# Patient Record
Sex: MALE | Race: WHITE | Employment: FULL TIME | URBAN - METROPOLITAN AREA
[De-identification: names, ages, dates, MRNs, and addresses within clinical notes are randomized per-mention and may not be internally consistent; named-entity substitution may affect disease eponyms.]

---

## 2017-10-31 PROBLEM — G47.33 OSA ON CPAP: Status: ACTIVE | Noted: 2017-10-31

## 2017-10-31 PROBLEM — Z99.89 OSA ON CPAP: Status: ACTIVE | Noted: 2017-10-31

## 2019-04-02 ENCOUNTER — OFFICE VISIT (OUTPATIENT)
Dept: FAMILY MEDICINE CLINIC | Age: 29
End: 2019-04-02
Payer: COMMERCIAL

## 2019-04-02 VITALS
OXYGEN SATURATION: 97 % | BODY MASS INDEX: 41.77 KG/M2 | SYSTOLIC BLOOD PRESSURE: 128 MMHG | DIASTOLIC BLOOD PRESSURE: 78 MMHG | TEMPERATURE: 98.2 F | HEIGHT: 69 IN | RESPIRATION RATE: 18 BRPM | WEIGHT: 282 LBS | HEART RATE: 93 BPM

## 2019-04-02 DIAGNOSIS — G47.33 OSA ON CPAP: ICD-10-CM

## 2019-04-02 DIAGNOSIS — Z76.89 ENCOUNTER TO ESTABLISH CARE: Primary | ICD-10-CM

## 2019-04-02 DIAGNOSIS — Z99.89 OSA ON CPAP: ICD-10-CM

## 2019-04-02 DIAGNOSIS — Z72.0 TOBACCO ABUSE: ICD-10-CM

## 2019-04-02 DIAGNOSIS — R05.9 COUGH: ICD-10-CM

## 2019-04-02 DIAGNOSIS — F90.9 ATTENTION DEFICIT HYPERACTIVITY DISORDER (ADHD), UNSPECIFIED ADHD TYPE: ICD-10-CM

## 2019-04-02 DIAGNOSIS — E66.01 CLASS 3 SEVERE OBESITY DUE TO EXCESS CALORIES WITH BODY MASS INDEX (BMI) OF 40.0 TO 44.9 IN ADULT, UNSPECIFIED WHETHER SERIOUS COMORBIDITY PRESENT (HCC): ICD-10-CM

## 2019-04-02 PROCEDURE — 1036F TOBACCO NON-USER: CPT | Performed by: FAMILY MEDICINE

## 2019-04-02 PROCEDURE — 99203 OFFICE O/P NEW LOW 30 MIN: CPT | Performed by: FAMILY MEDICINE

## 2019-04-02 PROCEDURE — G8417 CALC BMI ABV UP PARAM F/U: HCPCS | Performed by: FAMILY MEDICINE

## 2019-04-02 PROCEDURE — G8427 DOCREV CUR MEDS BY ELIG CLIN: HCPCS | Performed by: FAMILY MEDICINE

## 2019-04-02 RX ORDER — BUPROPION HYDROCHLORIDE 150 MG/1
TABLET ORAL
Qty: 60 TABLET | Refills: 1 | Status: SHIPPED | OUTPATIENT
Start: 2019-04-02 | End: 2019-04-30 | Stop reason: SINTOL

## 2019-04-02 RX ORDER — BENZONATATE 100 MG/1
100-200 CAPSULE ORAL 3 TIMES DAILY PRN
Qty: 60 CAPSULE | Refills: 0 | Status: SHIPPED | OUTPATIENT
Start: 2019-04-02 | End: 2019-04-09

## 2019-04-02 ASSESSMENT — PATIENT HEALTH QUESTIONNAIRE - PHQ9
1. LITTLE INTEREST OR PLEASURE IN DOING THINGS: 0
SUM OF ALL RESPONSES TO PHQ QUESTIONS 1-9: 0
SUM OF ALL RESPONSES TO PHQ9 QUESTIONS 1 & 2: 0
SUM OF ALL RESPONSES TO PHQ QUESTIONS 1-9: 0
2. FEELING DOWN, DEPRESSED OR HOPELESS: 0

## 2019-04-02 NOTE — PATIENT INSTRUCTIONS
Patient Education        bupropion  Pronunciation:  byoo PRO pee on  Brand:  Aplenzin, Buproban, Forfivo XL, Wellbutrin SR, Wellbutrin XL, Zyban, Zyban Advantage Pack  What is the most important information I should know about bupropion? You should not take bupropion if you have seizures or an eating disorder, or if you have suddenly stopped using alcohol, seizure medication, or sedatives. If you take Wellbutrin for depression, do not also take Zyban to quit smoking. Do not use bupropion within 14 days before or 14 days after you have used an MAO inhibitor, such as isocarboxazid, linezolid, methylene blue injection, phenelzine, rasagiline, selegiline, or tranylcypromine. Some young people have thoughts about suicide when first taking an antidepressant. Stay alert to changes in your mood or symptoms. Report any new or worsening symptoms to your doctor. What is bupropion? Bupropion is an antidepressant medication used to treat major depressive disorder and seasonal affective disorder. The Zyban brand of bupropion is used to help people stop smoking by reducing cravings and other withdrawal effects. Bupropion may also be used for purposes not listed in this medication guide. What should I discuss with my healthcare provider before taking bupropion? You should not take bupropion if you are allergic to it, or if you have:  · a seizure disorder;  · an eating disorder such as anorexia or bulimia; or  · if you have suddenly stopped using alcohol, seizure medication, or a sedative such as Xanax, Valium, Fiorinal, Klonopin, and others). Do not use an MAO inhibitor within 14 days before or 14 days after you take bupropion. A dangerous drug interaction could occur. MAO inhibitors include isocarboxazid, linezolid, phenelzine, rasagiline, selegiline, and tranylcypromine. Do not take bupropion to treat more than one condition at a time.  If you take bupropion for depression, do not also take this medicine to quit smoking. Bupropion may cause seizures, especially if you have certain medical conditions or use certain drugs. Tell your doctor about all of your medical conditions and the drugs you use. To make sure bupropion is safe for you, tell your doctor if you have:  · a history of head injury, seizures, or brain or spinal cord tumor;  · narrow-angle glaucoma;  · heart disease, high blood pressure, history of heart attack;  · diabetes;  · kidney or liver disease (especially cirrhosis);  · bipolar disorder or other mental illness; or  · if you drink alcohol. Some young people have thoughts about suicide when first taking an antidepressant. Your doctor will need to check your progress at regular visits. Your family or other caregivers should also be alert to changes in your mood or symptoms. It is not known whether this medicine will harm an unborn baby. Tell your doctor if you are pregnant or plan to become pregnant. Bupropion can pass into breast milk and may harm a nursing baby. You should not breast-feed while using this medicine. Bupropion is not approved for use by anyone younger than 25years old. How should I take bupropion? Follow all directions on your prescription label. Do not take this medicine in larger or smaller amounts or for longer than recommended. Too much of this medicine can increase your risk of a seizure. You may take bupropion with or without food. Do not crush, chew, or break an extended-release tablet. Swallow it whole. You should not change your dose or stop using bupropion suddenly, unless you have a seizure while taking this medicine. Stopping suddenly can cause unpleasant withdrawal symptoms. Ask your doctor how to safely stop using bupropion. If you take Zyban to help you stop smoking, you may continue to smoke for about 1 week after you start the medicine. Set a date to quit smoking during the second week of treatment.  Talk to your doctor if you have trouble quitting after taking Zyban for 7 weeks. Your doctor may prescribe nicotine patches or gum to help you stop smoking. Do not smoke at any time if you are using a nicotine product along with Zyban. Too much nicotine can cause serious side effects. Read all patient information, medication guides, and instruction sheets provided to you. Ask your doctor or pharmacist if you have any questions. You may have nicotine withdrawal symptoms when you stop smoking, including: increased appetite, weight gain, trouble sleeping, trouble concentrating, slower heart rate, having the urge to smoke, and feeling anxious, restless, depressed, angry, frustrated, or irritated. These symptoms may occur with or without using medication such as Zyban. Smoking cessation may also cause new or worsening mental health problems, such as depression. Bupropion can cause you to have a false positive drug screening test. If you provide a urine sample for drug screening, tell the laboratory staff that you are taking bupropion. Store at room temperature away from moisture, heat, and light. What happens if I miss a dose? Take the missed dose as soon as you remember. Skip the missed dose if it is almost time for your next scheduled dose. Do not  take extra medicine to make up the missed dose. What happens if I overdose? Seek emergency medical attention or call the Poison Help line at 1-986.305.6931. An overdose of bupropion can be fatal.  Overdose symptoms may include muscle stiffness, hallucinations, fast or uneven heartbeat, shallow breathing, or fainting. What should I avoid while taking bupropion? Drinking alcohol with bupropion may increase your risk of seizures. If you drink alcohol regularly, talk with your doctor before changing the amount you drink. Bupropion can also cause seizures in a regular drinker who suddenly stops drinking at the start of treatment with bupropion. Bupropion may impair your thinking or reactions.  Be careful if you drive or do anything that requires you to be alert. What are the possible side effects of bupropion? Get emergency medical help if you have signs of an allergic reaction: hives, rash or itching; fever, swollen glands, joint pain, general ill feeling; difficult breathing; swelling of your face, lips, tongue, or throat. Report any new or worsening symptoms to your doctor, such as: mood or behavior changes, anxiety, depression, panic attacks, trouble sleeping, or if you feel impulsive, irritable, agitated, hostile, aggressive, restless, hyperactive (mentally or physically), or have thoughts about suicide or hurting yourself. Call your doctor at once if you have:  · a seizure (convulsions);  · unusual changes in mood or behavior;  · a manic episode --racing thoughts, increased energy, reckless behavior, feeling extremely happy or irritable, talking more than usual, severe problems with sleep;  · blurred vision, tunnel vision, eye pain or swelling, or seeing halos around lights;  · fast heartbeats; or  · severe skin reaction --fever, sore throat, swelling in your face or tongue, burning in your eyes, skin pain, followed by a red or purple skin rash that spreads (especially in the face or upper body) and causes blistering and peeling. Common side effects may include:  · dry mouth, stuffy nose;  · nausea, constipation;  · sleep problems (insomnia);  · feeling anxious;  · dizziness; or  · joint pain. This is not a complete list of side effects and others may occur. Call your doctor for medical advice about side effects. You may report side effects to FDA at 8-927-FDA-6672. What other drugs will affect bupropion? You may have a higher risk of seizures if you use certain other medicines while taking bupropion. Many drugs can interact with bupropion. Tell your doctor about all medicines you use, and those you start or stop using during your treatment with bupropion.  This includes prescription and over-the-counter medicines, vitamins, and herbal products. Not all possible interactions are listed in this medication guide. Where can I get more information? Your pharmacist can provide more information about bupropion. Remember, keep this and all other medicines out of the reach of children, never share your medicines with others, and use this medication only for the indication prescribed. Every effort has been made to ensure that the information provided by [facility] is accurate, up-to-date, and complete, but no guarantee is made to that effect. Drug information contained herein may be time sensitive. [facility] information has been compiled for use by healthcare practitioners and consumers in the [place] and therefore [facility] does not warrant that uses outside of the [place] are appropriate, unless specifically indicated otherwise. [facility]'s drug information does not endorse drugs, diagnose patients or recommend therapy. [facility] drug information is an informational resource designed to assist licensed healthcare practitioners in caring for their patients and/or to serve consumers viewing this service as a supplement to, and not a substitute for, the expertise, skill, knowledge and judgment of healthcare practitioners. The absence of a warning for a given drug or drug combination in no way should be construed to indicate that the drug or drug combination is safe, effective or appropriate for any given patient. [facility] does not assume any responsibility for any aspect of healthcare administered with the aid of information [facility] provides. The information contained herein is not intended to cover all possible uses, directions, precautions, warnings, drug interactions, allergic reactions, or adverse effects. If you have questions about the drugs you are taking, check with your doctor, nurse or pharmacist.  Copyright 1318-2253 [employer] [address] [place] [address] Avenue: 20.01. Revision date: 5/9/2017.   Care instructions adapted under license by [facility] ([facility]). If you have questions about a medical condition or this instruction, always ask your healthcare professional. [address] any warranty or liability for your use of this information.

## 2019-04-02 NOTE — PROGRESS NOTES
2019    Chief Complaint   Patient presents with   Eladio Silva Doctor     HM needs labs, previous pcp Dr Liliane Barraza,     Sinus Problem     SOB, weight issues       Deborah Zarate (:  1990) is a 25 y.o. male, here for establishing care. They report a PMH of:  Past Medical History:   Diagnosis Date    ADHD (attention deficit hyperactivity disorder)     Sleep apnea      Social and family histories reviewed and updated as appropriate. He was previously a patient of Dr. Karin Daniel  He works as a   He is  and has a son     Tobacco abuse  - He is a current smoker, his is smoking a few cigarettes every few days and has been trying to cut back  - He does report chronic shortness of breath  - He would be interesting in smoking cessation therapy    Obesity  - He does tend to eat healthy diet, he does not exercise regularly. History of ADHD  - Long-standing, chronic issue.  - Has previously saw counseling and was on stimulant medications. - He complains of frequent difficulty with organization, completing tasks, etc.    BLADE  - diagnosed with sleep study in 2017  - compliant with CPAP, needs new supplies    Review of Systems   Constitutional: Positive for unexpected weight change. Negative for chills, fatigue and fever. HENT: Positive for congestion and rhinorrhea. Respiratory: Positive for cough and shortness of breath. Cardiovascular: Negative for chest pain, palpitations and leg swelling. Gastrointestinal: Negative for abdominal pain, constipation, diarrhea, nausea and vomiting. Genitourinary: Negative for difficulty urinating. Musculoskeletal: Negative for arthralgias, back pain and gait problem. Skin: Negative for rash. Neurological: Negative for dizziness, weakness and numbness. Prior to Visit Medications    Medication Sig Taking?  Authorizing Provider   Prenatal Multivit-Min-Fe-FA (PRENATAL VITAMINS PO) Take 1 tablet by mouth daily Yes index is 41.64 kg/m² as calculated from the following:    Height as of this encounter: 5' 9\" (1.753 m). Weight as of this encounter: 282 lb (127.9 kg). Physical Exam   Constitutional: He is oriented to person, place, and time. He appears well-developed and well-nourished. No distress. Obese   HENT:   Head: Normocephalic and atraumatic. Eyes: Conjunctivae and EOM are normal. Right eye exhibits no discharge. Left eye exhibits no discharge. Neck: Normal range of motion. Cardiovascular: Normal rate and regular rhythm. No murmur heard. Pulmonary/Chest: Effort normal and breath sounds normal. No respiratory distress. He has no wheezes. Abdominal: Soft. He exhibits no distension. There is no tenderness. Musculoskeletal: Normal range of motion. He exhibits no edema or deformity. Neurological: He is alert and oriented to person, place, and time. Skin: Skin is warm and dry. Psychiatric: He has a normal mood and affect. His behavior is normal.       ASSESSMENT/PLAN:   Diagnosis Orders   1. Encounter to establish care     2. BLADE on CPAP  DME Order for (Specify) as OP   3. Class 3 severe obesity due to excess calories with body mass index (BMI) of 40.0 to 44.9 in adult, unspecified whether serious comorbidity present (HCC)  TSH    CBC    Comprehensive Metabolic Panel    HEMOGLOBIN A1C    LIPID PANEL   4. Cough  benzonatate (TESSALON) 100 MG capsule   5. Tobacco abuse  buPROPion (WELLBUTRIN XL) 150 MG extended release tablet   6. Attention deficit hyperactivity disorder (ADHD), unspecified ADHD type         Additional plan and future considerations:   As above. Will start Wellbutrin for smoking cessation and ADHD.  on improvd diet, regular exercise, and weight loss. We'll order new CPAP supplies. Return to office in 4 weeks for lab review, obesity and ADHD follow-up or sooner if needed.     Educational materials and/or home exercises printed for patient's review and were included in patient instructions on his/her After Visit Summary and given to patient at the end of visit. Counseled regarding above diagnosis, including possible risks and complications,  especially if left uncontrolled. Counseled regarding the possible side effects, risks, benefits and alternatives to treatment; patient and/or guardian verbalizes understanding, agrees, feels comfortable with and wishes to proceed with above treatment plan. Advised patient to call with any new medication issues, and read all Rx info from pharmacy to assure aware of all possible risks and side effects of medication before taking. Reviewed age and gender appropriate health screeningexams and vaccinations. Advised patient regarding importance of keeping up with recommended health maintenance and to schedule as soon as possible if overdue, as this is important in assessing for undiagnosed pathology,especially cancer, as well as protecting against potentially harmful/life threatening disease. Patient and/or guardian verbalizes understanding and agrees with above counseling, assessment and plan. All questions answered.       Future Appointments   Date Time Provider Tanika Link   4/30/2019 10:15 AM Frederic Lamas DO [facility]         --Frederic Lamas DO on 4/2/2019 at 3:49 PM

## 2019-04-03 ENCOUNTER — HOSPITAL ENCOUNTER (OUTPATIENT)
Age: 29
Discharge: HOME | End: 2019-04-05
Payer: COMMERCIAL

## 2019-04-03 DIAGNOSIS — E66.01 CLASS 3 SEVERE OBESITY DUE TO EXCESS CALORIES WITH BODY MASS INDEX (BMI) OF 40.0 TO 44.9 IN ADULT, UNSPECIFIED WHETHER SERIOUS COMORBIDITY PRESENT (HCC): ICD-10-CM

## 2019-04-03 PROBLEM — F90.9 ATTENTION DEFICIT HYPERACTIVITY DISORDER (ADHD): Status: ACTIVE | Noted: 2019-04-03

## 2019-04-03 PROBLEM — Z72.0 TOBACCO ABUSE: Status: ACTIVE | Noted: 2019-04-03

## 2019-04-03 LAB
ALBUMIN SERPL-MCNC: 4.2 G/DL (ref 3.5–5.2)
ALP BLD-CCNC: 47 U/L (ref 40–129)
ALT SERPL-CCNC: 75 U/L (ref 0–40)
ANION GAP SERPL CALCULATED.3IONS-SCNC: 16 MMOL/L (ref 7–16)
AST SERPL-CCNC: 42 U/L (ref 0–39)
BILIRUB SERPL-MCNC: 0.3 MG/DL (ref 0–1.2)
BUN BLDV-MCNC: 14 MG/DL (ref 6–20)
CALCIUM SERPL-MCNC: 9 MG/DL (ref 8.6–10.2)
CHLORIDE BLD-SCNC: 102 MMOL/L (ref 98–107)
CHOLESTEROL, TOTAL: 156 MG/DL (ref 0–199)
CO2: 23 MMOL/L (ref 22–29)
CREAT SERPL-MCNC: 1 MG/DL (ref 0.7–1.2)
GFR AFRICAN AMERICAN: >60
GFR NON-AFRICAN AMERICAN: >60 ML/MIN/1.73
GLUCOSE BLD-MCNC: 100 MG/DL (ref 74–99)
HBA1C MFR BLD: 5.5 % (ref 4–5.6)
HCT VFR BLD CALC: 46.6 % (ref 37–54)
HDLC SERPL-MCNC: 30 MG/DL
HEMOGLOBIN: 15.6 G/DL (ref 12.5–16.5)
LDL CHOLESTEROL CALCULATED: 103 MG/DL (ref 0–99)
MCH RBC QN AUTO: 30.9 PG (ref 26–35)
MCHC RBC AUTO-ENTMCNC: 33.5 % (ref 32–34.5)
MCV RBC AUTO: 92.3 FL (ref 80–99.9)
PDW BLD-RTO: 14.6 FL (ref 11.5–15)
PLATELET # BLD: 216 E9/L (ref 130–450)
PMV BLD AUTO: 11.1 FL (ref 7–12)
POTASSIUM SERPL-SCNC: 4.2 MMOL/L (ref 3.5–5)
RBC # BLD: 5.05 E12/L (ref 3.8–5.8)
SODIUM BLD-SCNC: 141 MMOL/L (ref 132–146)
TOTAL PROTEIN: 7.5 G/DL (ref 6.4–8.3)
TRIGL SERPL-MCNC: 115 MG/DL (ref 0–149)
TSH SERPL DL<=0.05 MIU/L-ACNC: 5.48 UIU/ML (ref 0.27–4.2)
VLDLC SERPL CALC-MCNC: 23 MG/DL
WBC # BLD: 4.6 E9/L (ref 4.5–11.5)

## 2019-04-03 PROCEDURE — 84443 ASSAY THYROID STIM HORMONE: CPT

## 2019-04-03 PROCEDURE — 80053 COMPREHEN METABOLIC PANEL: CPT

## 2019-04-03 PROCEDURE — 85027 COMPLETE CBC AUTOMATED: CPT

## 2019-04-03 PROCEDURE — 80061 LIPID PANEL: CPT

## 2019-04-03 PROCEDURE — 83036 HEMOGLOBIN GLYCOSYLATED A1C: CPT

## 2019-04-03 PROCEDURE — 84439 ASSAY OF FREE THYROXINE: CPT

## 2019-04-03 PROCEDURE — 36415 COLL VENOUS BLD VENIPUNCTURE: CPT

## 2019-04-03 ASSESSMENT — ENCOUNTER SYMPTOMS
ABDOMINAL PAIN: 0
NAUSEA: 0
BACK PAIN: 0
SHORTNESS OF BREATH: 1
VOMITING: 0
CONSTIPATION: 0
COUGH: 1
DIARRHEA: 0
RHINORRHEA: 1

## 2019-04-04 DIAGNOSIS — R79.89 ELEVATED TSH: Primary | ICD-10-CM

## 2019-04-04 LAB — T4 FREE: 1.12 NG/DL (ref 0.93–1.7)

## 2019-04-19 ENCOUNTER — TELEPHONE (OUTPATIENT)
Dept: FAMILY MEDICINE CLINIC | Age: 29
End: 2019-04-19

## 2019-04-19 NOTE — TELEPHONE ENCOUNTER
Patient states that he has been having bad headaches for 3-4 days now not sure if it is due to the Wellbutrin?  What to do  Please advise    Mandy Maurer in [place]    Last Appointment   4/2/2019  Next Appointment  4/30/2019  Patient informed that all medications can take up to 72 business hours, can check with their pharmacy in the meantime if they like

## 2019-04-22 NOTE — TELEPHONE ENCOUNTER
It could potentially be a side effect of wellbutrin. Patient can discontinue and we can discuss alternative treatment at next office visit. Thanks.

## 2019-04-30 ENCOUNTER — OFFICE VISIT (OUTPATIENT)
Dept: FAMILY MEDICINE CLINIC | Age: 29
End: 2019-04-30
Payer: COMMERCIAL

## 2019-04-30 ENCOUNTER — HOSPITAL ENCOUNTER (OUTPATIENT)
Age: 29
Discharge: HOME | End: 2019-05-02
Payer: COMMERCIAL

## 2019-04-30 VITALS
DIASTOLIC BLOOD PRESSURE: 76 MMHG | OXYGEN SATURATION: 97 % | WEIGHT: 284 LBS | BODY MASS INDEX: 42.06 KG/M2 | RESPIRATION RATE: 18 BRPM | SYSTOLIC BLOOD PRESSURE: 128 MMHG | HEIGHT: 69 IN | TEMPERATURE: 98.4 F | HEART RATE: 90 BPM

## 2019-04-30 DIAGNOSIS — F90.9 ATTENTION DEFICIT HYPERACTIVITY DISORDER (ADHD), UNSPECIFIED ADHD TYPE: Primary | ICD-10-CM

## 2019-04-30 DIAGNOSIS — Z72.0 TOBACCO ABUSE: ICD-10-CM

## 2019-04-30 DIAGNOSIS — R79.89 ELEVATED LFTS: ICD-10-CM

## 2019-04-30 DIAGNOSIS — E66.01 CLASS 3 SEVERE OBESITY DUE TO EXCESS CALORIES WITHOUT SERIOUS COMORBIDITY WITH BODY MASS INDEX (BMI) OF 40.0 TO 44.9 IN ADULT (HCC): ICD-10-CM

## 2019-04-30 DIAGNOSIS — R51.9 NONINTRACTABLE HEADACHE, UNSPECIFIED CHRONICITY PATTERN, UNSPECIFIED HEADACHE TYPE: ICD-10-CM

## 2019-04-30 DIAGNOSIS — R79.89 ELEVATED TSH: ICD-10-CM

## 2019-04-30 LAB
ALBUMIN SERPL-MCNC: 4.5 G/DL (ref 3.5–5.2)
ALP BLD-CCNC: 53 U/L (ref 40–129)
ALT SERPL-CCNC: 139 U/L (ref 0–40)
AST SERPL-CCNC: 61 U/L (ref 0–39)
BILIRUB SERPL-MCNC: 0.4 MG/DL (ref 0–1.2)
BILIRUBIN DIRECT: <0.2 MG/DL (ref 0–0.3)
BILIRUBIN, INDIRECT: ABNORMAL MG/DL (ref 0–1)
TOTAL PROTEIN: 7.5 G/DL (ref 6.4–8.3)

## 2019-04-30 PROCEDURE — 80074 ACUTE HEPATITIS PANEL: CPT

## 2019-04-30 PROCEDURE — 36415 COLL VENOUS BLD VENIPUNCTURE: CPT

## 2019-04-30 PROCEDURE — G8417 CALC BMI ABV UP PARAM F/U: HCPCS | Performed by: FAMILY MEDICINE

## 2019-04-30 PROCEDURE — 4004F PT TOBACCO SCREEN RCVD TLK: CPT | Performed by: FAMILY MEDICINE

## 2019-04-30 PROCEDURE — G8427 DOCREV CUR MEDS BY ELIG CLIN: HCPCS | Performed by: FAMILY MEDICINE

## 2019-04-30 PROCEDURE — 99214 OFFICE O/P EST MOD 30 MIN: CPT | Performed by: FAMILY MEDICINE

## 2019-04-30 PROCEDURE — 80076 HEPATIC FUNCTION PANEL: CPT

## 2019-04-30 RX ORDER — VARENICLINE TARTRATE 25 MG
KIT ORAL
Qty: 53 TABLET | Refills: 0 | Status: SHIPPED | OUTPATIENT
Start: 2019-04-30 | End: 2019-05-21 | Stop reason: SDUPTHER

## 2019-04-30 RX ORDER — MELOXICAM 15 MG/1
15 TABLET ORAL DAILY PRN
Qty: 30 TABLET | Refills: 1 | Status: SHIPPED | OUTPATIENT
Start: 2019-04-30 | End: 2020-01-08

## 2019-04-30 RX ORDER — DEXTROAMPHETAMINE SACCHARATE, AMPHETAMINE ASPARTATE MONOHYDRATE, DEXTROAMPHETAMINE SULFATE AND AMPHETAMINE SULFATE 5; 5; 5; 5 MG/1; MG/1; MG/1; MG/1
20 CAPSULE, EXTENDED RELEASE ORAL EVERY MORNING
Qty: 30 CAPSULE | Refills: 0 | Status: SHIPPED | OUTPATIENT
Start: 2019-04-30 | End: 2019-05-21 | Stop reason: SDUPTHER

## 2019-04-30 RX ORDER — VARENICLINE TARTRATE 25 MG
KIT ORAL
Qty: 53 TABLET | Refills: 0 | Status: SHIPPED | OUTPATIENT
Start: 2019-04-30 | End: 2019-04-30 | Stop reason: SDUPTHER

## 2019-04-30 ASSESSMENT — ENCOUNTER SYMPTOMS
DIARRHEA: 0
SHORTNESS OF BREATH: 1
NAUSEA: 0
VOMITING: 0
COUGH: 0
ABDOMINAL PAIN: 0

## 2019-04-30 NOTE — PROGRESS NOTES
2019     Rolo Greenberg (:  1990) is a 25 y.o. male, with a:  Past Medical History:   Diagnosis Date    ADHD (attention deficit hyperactivity disorder)     Class 3 severe obesity due to excess calories without serious comorbidity with body mass index (BMI) of 40.0 to 44.9 in adult [facility]) 2019    Sleep apnea     Tobacco abuse 4/3/2019       Here for evaluation of the following medical concerns:  Chief Complaint   Patient presents with    Results     Labs done on 4/3/2019    Weight Management     ADHD follow up     Planning to move to [facility] soon    ADHD - this is a chronic issue. He had been diagnosed in childhood and had been on stimulant medication in the past.  He reports persistent worsening of his difficulty with focusing and completing tasks. He recently lost his job. He was started on Wellbutrin at last visit but did not tolerate due to severe headaches. Obesity - has been attempting to eat better. He does not exercise regularly    Elevated TSH - recent labs reviewed. He does complain of significant weight gain. He denies any significant fatigue. He denies any heat or cold intolerance. He denies any skin or hair changes. HLD - lipid panel reviewed    BLADE - compliant with CPAP    Tobacco abuse - he continues to smoke. He was unable tolerate Wellbutrin. He does report that he was previously on Chantix in the past with significant success. Review of Systems   Constitutional: Positive for unexpected weight change. Negative for fatigue. HENT: Negative. Respiratory: Positive for shortness of breath. Negative for cough. Cardiovascular: Negative for chest pain. Gastrointestinal: Negative for abdominal pain, diarrhea, nausea and vomiting. Endocrine: Negative for cold intolerance and heat intolerance. Genitourinary: Negative for difficulty urinating and dysuria. Neurological: Positive for headaches. Negative for weakness and numbness. unspecified ADHD type  amphetamine-dextroamphetamine (ADDERALL XR) 20 MG extended release capsule   2. Class 3 severe obesity due to excess calories without serious comorbidity with body mass index (BMI) of 40.0 to 44.9 in adult (HCC)     3. Elevated TSH     4. Elevated LFTs  Hepatitis Panel, Acute    Hepatic Function Panel   5. Tobacco abuse  varenicline (CHANTIX STARTING MONTH MYKEL) 0.5 MG X 11 & 1 MG X 42 tablet   6. Nonintractable headache, unspecified chronicity pattern, unspecified headache type  meloxicam (MOBIC) 15 MG tablet       Additional plan and future considerations:   As above. Will start adderall.  and improve diet, regular exercise, and weight loss. Continue to monitor thyroid function for now. Repeat hepatic function panel and check hepatitis. chantix for smoking cessation. RTO in 4 weeks or sooner if needed    Educational materials and/or home exercises printed for patient's review and were included in patient instructions on his/her After Visit Summary and given to patient at the end of visit. Counseled regarding above diagnosis, including possible risks and complications,  especially if left uncontrolled. Counseled regarding the possible side effects, risks, benefits and alternatives to treatment; patient and/or guardian verbalizes understanding, agrees, feels comfortable with and wishes to proceed with above treatment plan. Advised patient to call with any new medication issues, and read all Rx info from pharmacy to assure aware of all possible risks and side effects of medication before taking. Reviewed age and gender appropriate health screeningexams and vaccinations. Advised patient regarding importance of keeping up with recommended health maintenance and to schedule as soon as possible if overdue, as this is important in assessing for undiagnosed pathology,especially cancer, as well as protecting against potentially harmful/life threatening disease.       Patient and/or guardian verbalizes understanding and agrees with above counseling, assessment and plan. All questions answered.       Future Appointments   Date Time Provider Tanika Link   5/21/2019  9:00 AM Amanda Jones DO [facility]         --Amanda Jones DO on 4/30/2019 at 10:33 AM

## 2019-05-02 LAB
HAV IGM SER IA-ACNC: NORMAL
HEPATITIS B CORE IGM ANTIBODY: NORMAL
HEPATITIS B SURFACE ANTIGEN INTERPRETATION: NORMAL
HEPATITIS C ANTIBODY INTERPRETATION: NORMAL

## 2019-05-21 ENCOUNTER — OFFICE VISIT (OUTPATIENT)
Dept: FAMILY MEDICINE CLINIC | Age: 29
End: 2019-05-21
Payer: COMMERCIAL

## 2019-05-21 VITALS
DIASTOLIC BLOOD PRESSURE: 64 MMHG | WEIGHT: 271 LBS | HEIGHT: 69 IN | BODY MASS INDEX: 40.14 KG/M2 | SYSTOLIC BLOOD PRESSURE: 118 MMHG | OXYGEN SATURATION: 96 % | TEMPERATURE: 97.3 F | HEART RATE: 91 BPM

## 2019-05-21 DIAGNOSIS — Z72.0 TOBACCO ABUSE: ICD-10-CM

## 2019-05-21 DIAGNOSIS — R79.89 ELEVATED LFTS: ICD-10-CM

## 2019-05-21 DIAGNOSIS — F90.9 ATTENTION DEFICIT HYPERACTIVITY DISORDER (ADHD), UNSPECIFIED ADHD TYPE: Primary | ICD-10-CM

## 2019-05-21 DIAGNOSIS — Z23 NEED FOR TDAP VACCINATION: ICD-10-CM

## 2019-05-21 DIAGNOSIS — E66.01 CLASS 3 SEVERE OBESITY DUE TO EXCESS CALORIES WITHOUT SERIOUS COMORBIDITY WITH BODY MASS INDEX (BMI) OF 40.0 TO 44.9 IN ADULT (HCC): ICD-10-CM

## 2019-05-21 DIAGNOSIS — E03.8 SUBCLINICAL HYPOTHYROIDISM: ICD-10-CM

## 2019-05-21 PROCEDURE — G8417 CALC BMI ABV UP PARAM F/U: HCPCS | Performed by: FAMILY MEDICINE

## 2019-05-21 PROCEDURE — 99214 OFFICE O/P EST MOD 30 MIN: CPT | Performed by: FAMILY MEDICINE

## 2019-05-21 PROCEDURE — 90715 TDAP VACCINE 7 YRS/> IM: CPT | Performed by: FAMILY MEDICINE

## 2019-05-21 PROCEDURE — 90472 IMMUNIZATION ADMIN EACH ADD: CPT | Performed by: FAMILY MEDICINE

## 2019-05-21 PROCEDURE — G8427 DOCREV CUR MEDS BY ELIG CLIN: HCPCS | Performed by: FAMILY MEDICINE

## 2019-05-21 PROCEDURE — 90732 PPSV23 VACC 2 YRS+ SUBQ/IM: CPT | Performed by: FAMILY MEDICINE

## 2019-05-21 PROCEDURE — 90471 IMMUNIZATION ADMIN: CPT | Performed by: FAMILY MEDICINE

## 2019-05-21 PROCEDURE — 4004F PT TOBACCO SCREEN RCVD TLK: CPT | Performed by: FAMILY MEDICINE

## 2019-05-21 RX ORDER — DEXTROAMPHETAMINE SACCHARATE, AMPHETAMINE ASPARTATE MONOHYDRATE, DEXTROAMPHETAMINE SULFATE AND AMPHETAMINE SULFATE 7.5; 7.5; 7.5; 7.5 MG/1; MG/1; MG/1; MG/1
30 CAPSULE, EXTENDED RELEASE ORAL EVERY MORNING
Qty: 30 CAPSULE | Refills: 0 | Status: SHIPPED | OUTPATIENT
Start: 2019-07-19 | End: 2019-10-03 | Stop reason: SDUPTHER

## 2019-05-21 RX ORDER — DEXTROAMPHETAMINE SACCHARATE, AMPHETAMINE ASPARTATE MONOHYDRATE, DEXTROAMPHETAMINE SULFATE AND AMPHETAMINE SULFATE 7.5; 7.5; 7.5; 7.5 MG/1; MG/1; MG/1; MG/1
30 CAPSULE, EXTENDED RELEASE ORAL EVERY MORNING
Qty: 30 CAPSULE | Refills: 0 | Status: SHIPPED | OUTPATIENT
Start: 2019-06-18 | End: 2019-05-21 | Stop reason: SDUPTHER

## 2019-05-21 RX ORDER — DEXTROAMPHETAMINE SACCHARATE, AMPHETAMINE ASPARTATE MONOHYDRATE, DEXTROAMPHETAMINE SULFATE AND AMPHETAMINE SULFATE 7.5; 7.5; 7.5; 7.5 MG/1; MG/1; MG/1; MG/1
30 CAPSULE, EXTENDED RELEASE ORAL EVERY MORNING
Qty: 30 CAPSULE | Refills: 0 | Status: SHIPPED | OUTPATIENT
Start: 2019-05-21 | End: 2019-05-21 | Stop reason: SDUPTHER

## 2019-05-21 RX ORDER — VARENICLINE TARTRATE 1 MG/1
1 TABLET, FILM COATED ORAL 2 TIMES DAILY
Qty: 60 TABLET | Refills: 2 | Status: SHIPPED | OUTPATIENT
Start: 2019-05-21 | End: 2019-10-03 | Stop reason: SDUPTHER

## 2019-05-21 ASSESSMENT — ENCOUNTER SYMPTOMS
DIARRHEA: 0
CONSTIPATION: 0
SHORTNESS OF BREATH: 1
WHEEZING: 0
COUGH: 1
VOMITING: 0
NAUSEA: 0

## 2019-05-21 NOTE — PROGRESS NOTES
2019     Kaylee Snyder (:  1990) is a 25 y.o. male, with a:  Past Medical History:   Diagnosis Date    ADHD (attention deficit hyperactivity disorder)     Class 3 severe obesity due to excess calories without serious comorbidity with body mass index (BMI) of 40.0 to 44.9 in adult [facility]) 2019    Sleep apnea     Tobacco abuse 4/3/2019       Here for evaluation of the following medical concerns:  Chief Complaint   Patient presents with    1 Month Follow-Up     started chantix on Friday, adderal working till late afternnon then [employer]".  Health Maintenance     Tdap      ADHD - this is a chronic issue. He had been diagnosed in childhood and had been on stimulant medication in the past.  He reports persistent worsening of his difficulty with focusing and completing tasks. He recently lost his job. He was started on Wellbutrin at last visit but did not tolerate due to severe headaches. At previous visit, he was started on adderal. He reports improvement of symptoms. He does not decreased effectiveness in the evenings. Tobacco abuse - He was unable tolerate Wellbutrin. He was recently started on chantix and has noted a decrease in cravings    Subclinical hypothyroidism - recent labs reviewed.       BLADE - compliant with CPAP    Obesity - has been attempting to eat better, he has cut out pop. He has been more active. He has lost 10 lbs since last visit    Review of Systems   Constitutional: Negative for chills, fatigue and fever. Respiratory: Positive for cough and shortness of breath. Negative for wheezing. Gastrointestinal: Negative for constipation, diarrhea, nausea and vomiting. Genitourinary: Negative for difficulty urinating and dysuria. Psychiatric/Behavioral: Positive for decreased concentration (improved). Negative for dysphoric mood. The patient is nervous/anxious (improved). Prior to Visit Medications    Medication Sig Taking?  Authorizing Provider amphetamine-dextroamphetamine (ADDERALL XR) 20 MG extended release capsule Take 1 capsule by mouth every morning for 30 days. Yes Riaz Pierce, DO   meloxicam (MOBIC) 15 MG tablet Take 1 tablet by mouth daily as needed for Pain Yes Riaz Pierce, DO   varenicline (CHANTIX STARTING MONTH PAK) 0.5 MG X 11 & 1 MG X 42 tablet Take as directed on package. Yes Amanda Jones, DO   Prenatal Multivit-Min-Fe-FA (PRENATAL VITAMINS PO) Take 1 tablet by mouth daily Yes Historical Provider, MD        Social History     Tobacco Use    Smoking status: Current Some Day Smoker     Packs/day: 1.00     Years: 10.00     Pack years: 10.00    Smokeless tobacco: Never Used   Substance Use Topics    Alcohol use: Yes     Comment: social        Past Surgical History:   Procedure Laterality Date    FOOT SURGERY  2009    left foot       Vitals:    05/21/19 0910   BP: 118/64   Site: Left Upper Arm   Position: Sitting   Pulse: 91   Temp: 97.3 °F (36.3 °C)   SpO2: 96%   Weight: 271 lb (122.9 kg)   Height: 5' 9\" (1.753 m)     Estimated body mass index is 40.02 kg/m² as calculated from the following:    Height as of this encounter: 5' 9\" (1.753 m). Weight as of this encounter: 271 lb (122.9 kg). Physical Exam   Constitutional: He is oriented to person, place, and time. He appears well-developed and well-nourished. No distress. HENT:   Head: Normocephalic and atraumatic. Eyes: EOM are normal.   Neck: Normal range of motion. Cardiovascular: Normal rate and regular rhythm. No murmur heard. Pulmonary/Chest: Effort normal and breath sounds normal. No respiratory distress. He has no wheezes. Abdominal: Soft. He exhibits no distension. There is no tenderness. Musculoskeletal: Normal range of motion. He exhibits no edema or deformity. Neurological: He is alert and oriented to person, place, and time. Skin: Skin is warm. Psychiatric: He has a normal mood and affect. ASSESSMENT/PLAN:   Diagnosis Orders   1.  Attention deficit hyperactivity disorder (ADHD), unspecified ADHD type  amphetamine-dextroamphetamine (ADDERALL XR) 30 MG extended release capsule    DISCONTINUED: amphetamine-dextroamphetamine (ADDERALL XR) 30 MG extended release capsule    DISCONTINUED: amphetamine-dextroamphetamine (ADDERALL XR) 30 MG extended release capsule   2. Tobacco abuse  Pneumococcal polysaccharide vaccine 23-valent greater than or equal to 3yo subcutaneous/IM   3. Class 3 severe obesity due to excess calories without serious comorbidity with body mass index (BMI) of 40.0 to 44.9 in adult ([facility] [address]     4. Subclinical hypothyroidism     5. Elevated LFTs     6. Need for Tdap vaccination  Tdap (age 6y and older) IM ([employer])       Additional plan and future considerations:   As above. Increase adderall. Continue chantix. Congratulated on weight loss.  on continued improved diet and regular exercise. Instructed to establish with PCP in [place] for follow up of subclinical hypothyroidism and LFTs. RTO as needed.       --Lucinda Parmar, DO on 5/21/2019 at 9:17 AM

## 2019-05-21 NOTE — PATIENT INSTRUCTIONS
Patient Education     Good luck with the move to [place]! Be sure to establish with a primary care physician to follow up on the following issues:   - subclinical hypothyroidism   - elevated LFTs   - tobacco abuse   - ADHD    with recommended repeat labs in the next few months and/or imaging if needed       Pneumococcal Polysaccharide Vaccine: What You Need to Know  Why get vaccinated? Vaccination can protect older adults (and some children and younger adults) from pneumococcal disease. Pneumococcal disease is caused by bacteria that can spread from person to person through close contact. It can cause ear infections, and it can also lead to more serious infections of the:  · Lungs (pneumonia),  · Blood (bacteremia), and  · Covering of the brain and spinal cord (meningitis). Meningitis can cause deafness and brain damage, and it can be fatal.  Anyone can get pneumococcal disease, but children under 3years of age, people with certain medical conditions, adults over 72years of age, and cigarette smokers are at the highest risk. About 18,000 older adults die each year from pneumococcal disease in the [place]. Treatment of pneumococcal infections with penicillin and other drugs used to be more effective. But some strains of the disease have become resistant to these drugs. This makes prevention of the disease, through vaccination, even more important. Pneumococcal polysaccharide vaccine (PPSV23)  Pneumococcal polysaccharide vaccine (PPSV23) protects against 23 types of pneumococcal bacteria. It will not prevent all pneumococcal disease. PPSV23 is recommended for:  · All adults 72years of age and older,  · Anyone 2 through 59years of age with certain long-term health problems,  · Anyone 2 through 59years of age with a weakened immune system,  · Adults 23 through 59years of age who smoke cigarettes or have asthma. Most people need only one dose of PPSV.  A second dose is recommended for certain high-risk groups. People 72 and older should get a dose even if they have gotten one or more doses of the vaccine before they turned 65. Your healthcare provider can give you more information about these recommendations. Most healthy adults develop protection within 2 to 3 weeks of getting the shot. Some people should not get this vaccine  · Anyone who has had a life-threatening allergic reaction to PPSV should not get another dose. · Anyone who has a severe allergy to any component of PPSV should not receive it. Tell your provider if you have any severe allergies. · Anyone who is moderately or severely ill when the shot is scheduled may be asked to wait until they recover before getting the vaccine. Someone with a mild illness can usually be vaccinated. · Children less than 3years of age should not receive this vaccine. · There is no evidence that PPSV is harmful to either a pregnant woman or to her fetus. However, as a precaution, women who need the vaccine should be vaccinated before becoming pregnant, if possible. Risks of a vaccine reaction  With any medicine, including vaccines, there is a chance of side effects. These are usually mild and go away on their own, but serious reactions are also possible. About half of people who get PPSV have mild side effects, such as redness or pain where the shot is given, which go away within about two days. Less than 1 out of 100 people develop a fever, muscle aches, or more severe local reactions. Problems that could happen after any vaccine:  · People sometimes faint after a medical procedure, including vaccination. Sitting or lying down for about 15 minutes can help prevent fainting, and injuries caused by a fall. Tell your doctor if you feel dizzy, or have vision changes or ringing in the ears. · Some people get severe pain in the shoulder and have difficulty moving the arm where a shot was given. This happens very rarely.   · Any medication can cause a severe allergic reaction. Such reactions from a vaccine are very rare, estimated at about 1 in a million doses, and would happen within a few minutes to a few hours after the vaccination. As with any medicine, there is a very remote chance of a vaccine causing a serious injury or death. The safety of vaccines is always being monitored. For more information, visit: www.cdc.gov/vaccinesafety/  What if there is a serious reaction? What should I look for? Look for anything that concerns you, such as signs of a severe allergic reaction, very high fever, or unusual behavior. Signs of a severe allergic reaction can include hives, swelling of the face and throat, difficulty breathing, a fast heartbeat, dizziness, and weakness. These would usually start a few minutes to a few hours after the vaccination. What should I do? If you think it is a severe allergic reaction or other emergency that can't wait, call 9-1-1 or get to the nearest hospital. Otherwise, call your doctor. Afterward, the reaction should be reported to the Vaccine Adverse Event Reporting System (VAERS). Your doctor might file this report, or you can do it yourself through the VAERS web site at www.vaers. [facility].gov, or by calling 5-280.796.8621. [facility] does not give medical advice. How can I learn more? · Ask your doctor. He or she can give you the vaccine package insert or suggest other sources of information. · Call your local or state health department. · Contact the Centers for Disease Control and Prevention (CDC):  ? Call 4-412.350.8763 (1-800-CDC-INFO) or  ? Visit CDC's website at www.cdc.gov/vaccines  Vaccine Information Statement  PPSV Vaccine  (04/24/2015)  Department of Health and Human Services  Centers for Disease Control and Prevention  Many Vaccine Information Statements are available in Greek and other languages. See www.immunize.org/vis. Hojas de información Sobre Vacunas están disponibles en español y en muchos otros idiomas. Anoop Amaro. Care instructions adapted under license by [facility] ([facility]). If you have questions about a medical condition or this instruction, always ask your healthcare professional. Selwyn Griffiths any warranty or liability for your use of this information. Patient Education        Tdap (Tetanus, Diphtheria, Pertussis) Vaccine: What You Need to Know  Why get vaccinated? Tetanus, diphtheria, and pertussis are very serious diseases. Tdap vaccine can protect us from these diseases. And Tdap vaccine given to pregnant women can protect  babies against pertussis. Tetanus (lockjaw) is rare in the [facility] today. It causes painful muscle tightening and stiffness, usually all over the body. · It can lead to tightening of muscles in the head and neck so you can't open your mouth, swallow, or sometimes even breathe. Tetanus kills about 1 out of 10 people who are infected even after receiving the best medical care. Diphtheria is also rare in the [place] today. It can cause a thick coating to form in the back of the throat. · It can lead to breathing problems, heart failure, paralysis, and death. Pertussis (whooping cough) causes severe coughing spells, which can cause difficulty breathing, vomiting, and disturbed sleep. · It can also lead to weight loss, incontinence, and rib fractures. Up to 2 in 100 adolescents and 5 in 100 adults with pertussis are hospitalized or have complications, which could include pneumonia or death. These diseases are caused by bacteria. Diphtheria and pertussis are spread from person to person through secretions from coughing or sneezing. Tetanus enters the body through cuts, scratches, or wounds. Before vaccines, as many as 200,000 cases of diphtheria, 200,000 cases of pertussis, and hundreds of cases of tetanus were reported in the [place] each year.  Since vaccination began, reports of cases for tetanus and diphtheria have dropped by about 99% and for pertussis by about 80%. Tdap vaccine  The Tdap vaccine can protect adolescents and adults from tetanus, diphtheria, and pertussis. One dose of Tdap is routinely given at age 6 or 15. People who did not get Tdap at that age should get it as soon as possible. Tdap is especially important for health care professionals and anyone having close contact with a baby younger than 12 months. Pregnant women should get a dose of Tdap during every pregnancy, to protect the  from pertussis. Infants are most at risk for severe, life-threatening complications from pertussis. Another vaccine, called Td, protects against tetanus and diphtheria, but not pertussis. A Td booster should be given every 10 years. Tdap may be given as one of these boosters if you have never gotten Tdap before. Tdap may also be given after a severe cut or burn to prevent tetanus infection. Your doctor or the person giving you the vaccine can give you more information. Tdap may safely be given at the same time as other vaccines. Some people should not get this vaccine  · A person who has ever had a life-threatening allergic reaction after a previous dose of any diphtheria-, tetanus-, or pertussis-containing vaccine, OR has a severe allergy to any part of this vaccine, should not get Tdap vaccine. Tell the person giving the vaccine about any severe allergies. · Anyone who had coma or long repeated seizures within 7 days after a childhood dose of DTP or DTaP, or a previous dose of Tdap, should not get Tdap, unless a cause other than the vaccine was found. They can still get Td. · Talk to your doctor if you:  ? Have seizures or another nervous system problem. ? Had severe pain or swelling after any vaccine containing diphtheria, tetanus, or pertussis. ? Ever had a condition called Guillain-Barré Syndrome (GBS). ? Aren't feeling well on the day the shot is scheduled.   Risks  With any medicine, including vaccines, or [facility] health department. · Contact the Centers for Disease Control and Prevention (CDC):  ? Call 8-371.561.4378 (6-486-VSH-INFO) or  ? Visit CDC's website at www.cdc.gov/vaccines  Vaccine Information Statement (Interim)  Tdap Vaccine  (2/24/15)  [address] 347OD-82  Department of Health and Human Services  Centers for Disease Control and Prevention  Many Vaccine Information Statements are available in [place] and other languages. See www.immunize.org/vis. Muchas hojas de información sobre vacunas están disponibles en español y en otros idiomas. Visite www.immunize.org/vis. Care instructions adapted under license by [facility] ([facility]). If you have questions about a medical condition or this instruction, always ask your healthcare professional. [address] any warranty or liability for your use of this information.

## 2019-10-03 DIAGNOSIS — F90.9 ATTENTION DEFICIT HYPERACTIVITY DISORDER (ADHD), UNSPECIFIED ADHD TYPE: ICD-10-CM

## 2019-10-03 RX ORDER — VARENICLINE TARTRATE 1 MG/1
1 TABLET, FILM COATED ORAL 2 TIMES DAILY
Qty: 60 TABLET | Refills: 0 | Status: SHIPPED | OUTPATIENT
Start: 2019-10-03 | End: 2020-08-12 | Stop reason: SDUPTHER

## 2019-10-03 RX ORDER — DEXTROAMPHETAMINE SACCHARATE, AMPHETAMINE ASPARTATE MONOHYDRATE, DEXTROAMPHETAMINE SULFATE AND AMPHETAMINE SULFATE 7.5; 7.5; 7.5; 7.5 MG/1; MG/1; MG/1; MG/1
30 CAPSULE, EXTENDED RELEASE ORAL EVERY MORNING
Qty: 30 CAPSULE | Refills: 0 | Status: SHIPPED | OUTPATIENT
Start: 2019-10-03 | End: 2020-01-08 | Stop reason: SDUPTHER

## 2020-01-08 ENCOUNTER — HOSPITAL ENCOUNTER (OUTPATIENT)
Age: 30
Discharge: HOME | End: 2020-01-10
Payer: COMMERCIAL

## 2020-01-08 ENCOUNTER — OFFICE VISIT (OUTPATIENT)
Dept: FAMILY MEDICINE CLINIC | Age: 30
End: 2020-01-08
Payer: COMMERCIAL

## 2020-01-08 VITALS
HEIGHT: 69 IN | DIASTOLIC BLOOD PRESSURE: 86 MMHG | HEART RATE: 102 BPM | BODY MASS INDEX: 41.03 KG/M2 | OXYGEN SATURATION: 97 % | SYSTOLIC BLOOD PRESSURE: 110 MMHG | TEMPERATURE: 96.7 F | WEIGHT: 277 LBS

## 2020-01-08 LAB
ALBUMIN SERPL-MCNC: 4.4 G/DL (ref 3.5–5.2)
ALP BLD-CCNC: 48 U/L (ref 40–129)
ALT SERPL-CCNC: 72 U/L (ref 0–40)
ANION GAP SERPL CALCULATED.3IONS-SCNC: 18 MMOL/L (ref 7–16)
AST SERPL-CCNC: 32 U/L (ref 0–39)
BILIRUB SERPL-MCNC: 0.3 MG/DL (ref 0–1.2)
BUN BLDV-MCNC: 15 MG/DL (ref 6–20)
CALCIUM SERPL-MCNC: 9.7 MG/DL (ref 8.6–10.2)
CHLORIDE BLD-SCNC: 102 MMOL/L (ref 98–107)
CO2: 22 MMOL/L (ref 22–29)
CREAT SERPL-MCNC: 0.9 MG/DL (ref 0.7–1.2)
GFR AFRICAN AMERICAN: >60
GFR NON-AFRICAN AMERICAN: >60 ML/MIN/1.73
GLUCOSE BLD-MCNC: 113 MG/DL (ref 74–99)
POTASSIUM SERPL-SCNC: 4.3 MMOL/L (ref 3.5–5)
SODIUM BLD-SCNC: 142 MMOL/L (ref 132–146)
T4 FREE: 1.21 NG/DL (ref 0.93–1.7)
TOTAL PROTEIN: 7.2 G/DL (ref 6.4–8.3)
TSH SERPL DL<=0.05 MIU/L-ACNC: 3.05 UIU/ML (ref 0.27–4.2)

## 2020-01-08 PROCEDURE — G8417 CALC BMI ABV UP PARAM F/U: HCPCS | Performed by: FAMILY MEDICINE

## 2020-01-08 PROCEDURE — G8427 DOCREV CUR MEDS BY ELIG CLIN: HCPCS | Performed by: FAMILY MEDICINE

## 2020-01-08 PROCEDURE — 90471 IMMUNIZATION ADMIN: CPT | Performed by: FAMILY MEDICINE

## 2020-01-08 PROCEDURE — G8431 POS CLIN DEPRES SCRN F/U DOC: HCPCS | Performed by: FAMILY MEDICINE

## 2020-01-08 PROCEDURE — 80053 COMPREHEN METABOLIC PANEL: CPT

## 2020-01-08 PROCEDURE — 84439 ASSAY OF FREE THYROXINE: CPT

## 2020-01-08 PROCEDURE — 90686 IIV4 VACC NO PRSV 0.5 ML IM: CPT | Performed by: FAMILY MEDICINE

## 2020-01-08 PROCEDURE — 36415 COLL VENOUS BLD VENIPUNCTURE: CPT

## 2020-01-08 PROCEDURE — G8482 FLU IMMUNIZE ORDER/ADMIN: HCPCS | Performed by: FAMILY MEDICINE

## 2020-01-08 PROCEDURE — 4004F PT TOBACCO SCREEN RCVD TLK: CPT | Performed by: FAMILY MEDICINE

## 2020-01-08 PROCEDURE — 99214 OFFICE O/P EST MOD 30 MIN: CPT | Performed by: FAMILY MEDICINE

## 2020-01-08 PROCEDURE — 84443 ASSAY THYROID STIM HORMONE: CPT

## 2020-01-08 RX ORDER — DEXTROAMPHETAMINE SACCHARATE, AMPHETAMINE ASPARTATE MONOHYDRATE, DEXTROAMPHETAMINE SULFATE AND AMPHETAMINE SULFATE 7.5; 7.5; 7.5; 7.5 MG/1; MG/1; MG/1; MG/1
30 CAPSULE, EXTENDED RELEASE ORAL EVERY MORNING
Qty: 30 CAPSULE | Refills: 0 | Status: SHIPPED | OUTPATIENT
Start: 2020-01-08 | End: 2020-02-11 | Stop reason: SDUPTHER

## 2020-01-08 RX ORDER — ESCITALOPRAM OXALATE 10 MG/1
10 TABLET ORAL DAILY
Qty: 30 TABLET | Refills: 1 | Status: SHIPPED
Start: 2020-01-08 | End: 2020-02-11 | Stop reason: SINTOL

## 2020-01-08 ASSESSMENT — ENCOUNTER SYMPTOMS
CONSTIPATION: 0
SHORTNESS OF BREATH: 0
NAUSEA: 0
VOMITING: 0
COUGH: 0
ABDOMINAL PAIN: 0
DIARRHEA: 0

## 2020-01-08 ASSESSMENT — PATIENT HEALTH QUESTIONNAIRE - PHQ9
3. TROUBLE FALLING OR STAYING ASLEEP: 2
9. THOUGHTS THAT YOU WOULD BE BETTER OFF DEAD, OR OF HURTING YOURSELF: 0
7. TROUBLE CONCENTRATING ON THINGS, SUCH AS READING THE NEWSPAPER OR WATCHING TELEVISION: 3
1. LITTLE INTEREST OR PLEASURE IN DOING THINGS: 1
6. FEELING BAD ABOUT YOURSELF - OR THAT YOU ARE A FAILURE OR HAVE LET YOURSELF OR YOUR FAMILY DOWN: 2
4. FEELING TIRED OR HAVING LITTLE ENERGY: 3
10. IF YOU CHECKED OFF ANY PROBLEMS, HOW DIFFICULT HAVE THESE PROBLEMS MADE IT FOR YOU TO DO YOUR WORK, TAKE CARE OF THINGS AT HOME, OR GET ALONG WITH OTHER PEOPLE: 2
SUM OF ALL RESPONSES TO PHQ QUESTIONS 1-9: 18
SUM OF ALL RESPONSES TO PHQ9 QUESTIONS 1 & 2: 4
8. MOVING OR SPEAKING SO SLOWLY THAT OTHER PEOPLE COULD HAVE NOTICED. OR THE OPPOSITE, BEING SO FIGETY OR RESTLESS THAT YOU HAVE BEEN MOVING AROUND A LOT MORE THAN USUAL: 2
2. FEELING DOWN, DEPRESSED OR HOPELESS: 3
SUM OF ALL RESPONSES TO PHQ QUESTIONS 1-9: 18
5. POOR APPETITE OR OVEREATING: 2

## 2020-01-08 NOTE — PROGRESS NOTES
Risk factors: negative life event recent unsuccessful move to [place]. Previous treatment includes individual therapy. He complains of the following side effects from the treatment: N/A. Review of Systems   Constitutional: Negative for chills and fever. Respiratory: Negative for cough and shortness of breath. Cardiovascular: Negative for chest pain and leg swelling. Gastrointestinal: Negative for abdominal pain, constipation, diarrhea, nausea and vomiting. Genitourinary: Negative for difficulty urinating and dysuria. Neurological: Negative for headaches. Psychiatric/Behavioral: Positive for agitation and dysphoric mood. Negative for self-injury and sleep disturbance. The patient is nervous/anxious. Prior to Visit Medications    Medication Sig Taking? Authorizing Provider   Prenatal Multivit-Min-Fe-FA (PRENATAL VITAMINS PO) Take 1 tablet by mouth daily Yes Historical Provider, MD   amphetamine-dextroamphetamine (ADDERALL XR) 30 MG extended release capsule Take 1 capsule by mouth every morning for 30 days. Patient not taking: Reported on 1/8/2020  Adrián Mcgarry DO   varenicline (CHANTIX) 1 MG tablet Take 1 tablet by mouth 2 times daily  Patient not taking: Reported on 1/8/2020  Adrián Mcgarry DO        Social History     Tobacco Use    Smoking status: Current Some Day Smoker     Packs/day: 1.00     Years: 10.00     Pack years: 10.00    Smokeless tobacco: Never Used   Substance Use Topics    Alcohol use: Yes     Comment: social        Past Surgical History:   Procedure Laterality Date    FOOT SURGERY  2009    left foot       Vitals:    01/08/20 0908   BP: 110/86   Site: Left Upper Arm   Position: Sitting   Pulse: 102   Temp: 96.7 °F (35.9 °C)   TempSrc: Temporal   SpO2: 97%   Weight: 277 lb (125.6 kg)   Height: 5' 9\" (1.753 m)     Estimated body mass index is 40.91 kg/m² as calculated from the following:    Height as of this encounter: 5' 9\" (1.753 m).     Weight as of this encounter: 277 lb (125.6 kg). Physical Exam  Constitutional:       General: He is not in acute distress. Appearance: He is well-developed. He is obese. HENT:      Head: Normocephalic and atraumatic. Eyes:      Pupils: Pupils are equal, round, and reactive to light. Neck:      Musculoskeletal: Normal range of motion. Thyroid: No thyromegaly. Cardiovascular:      Rate and Rhythm: Normal rate and regular rhythm. Pulmonary:      Effort: Pulmonary effort is normal. No respiratory distress. Breath sounds: Normal breath sounds. No wheezing or rales. Abdominal:      General: There is no distension. Palpations: Abdomen is soft. Tenderness: There is no tenderness. Musculoskeletal:         General: No deformity. Skin:     General: Skin is warm. Findings: No rash. Neurological:      General: No focal deficit present. Mental Status: He is alert. Mental status is at baseline. Psychiatric:         Mood and Affect: Mood normal.         Behavior: Behavior normal.         ASSESSMENT/PLAN:  Jitendra Escamilla was seen today for adhd, depression and nicotine dependence. Diagnoses and all orders for this visit:    Attention deficit hyperactivity disorder (ADHD), unspecified ADHD type  -     amphetamine-dextroamphetamine (ADDERALL XR) 30 MG extended release capsule; Take 1 capsule by mouth every morning for 30 days. Tobacco abuse    Subclinical hypothyroidism  -     TSH; Future  -     T4, FREE; Future    Elevated LFTs  -     Comprehensive Metabolic Panel; Future    Class 3 severe obesity due to excess calories without serious comorbidity with body mass index (BMI) of 40.0 to 44.9 in adult [facility])    Depression, unspecified depression type  -     escitalopram (LEXAPRO) 10 MG tablet;  Take 1 tablet by mouth daily    Positive depression screening  -     Positive Screen for Clinical Depression with a Documented Follow-up Plan     Other orders  -     INFLUENZA, QUADV, 3 YRS AND OLDER, IM

## 2020-01-08 NOTE — PATIENT INSTRUCTIONS
Patient Education        escitalopram  Pronunciation:  MARICRUZ RIDER o kathy  Brand:  Lexapro  What is the most important information I should know about escitalopram?  You should not use this medicine you also take pimozide (Orap) or citalopram (Celexa). Do not use escitalopram within 14 days before or 14 days after you have used an MAO inhibitor, such as isocarboxazid, linezolid, methylene blue injection, phenelzine, rasagiline, selegiline, or tranylcypromine. Some young people have thoughts about suicide when first taking an antidepressant. Stay alert to changes in your mood or symptoms. Report any new or worsening symptoms to your doctor. Seek medical attention right away if you have symptoms of serotonin syndrome, such as: agitation, hallucinations, fever, sweating, shivering, fast heart rate, muscle stiffness, twitching, loss of coordination, nausea, vomiting, or diarrhea. Do not give this medicine to anyone under 12 years. What is escitalopram?  Escitalopram is an antidepressant in a group of drugs called selective serotonin reuptake inhibitors (SSRIs). Escitalopram affects chemicals in the brain that may be unbalanced in people with depression or anxiety. Escitalopram is used to treat anxiety in adults. Escitalopram is also used to treat major depressive disorder in adults and adolescents who are at least 15years old. Escitalopram may also be used for purposes not listed in this medication guide. What should I discuss with my healthcare provider before taking escitalopram?  You should not use this medicine if you are allergic to escitalopram or citalopram (Celexa), or if:  · you also take pimozide or citalopram.  Do not use escitalopram within 14 days before or 14 days after you have used an MAO inhibitor. A dangerous drug interaction could occur. MAO inhibitors include isocarboxazid, linezolid, phenelzine, rasagiline, selegiline, and tranylcypromine.   Some medicines can interact with escitalopram and cause a serious condition called serotonin syndrome. Be sure your doctor knows if you also take stimulant medicine, opioid medicine, herbal products, or medicine for depression, mental illness, Parkinson's disease, migraine headaches, serious infections, or prevention of nausea and vomiting. Ask your doctor before making any changes in how or when you take your medications. To make sure escitalopram is safe for you, tell your doctor if you have ever had:  · liver or kidney disease;  · seizures;  · low levels of sodium in your blood;  · heart disease, high blood pressure;  · a stroke;  · a bleeding or blood clotting disorder;  · bipolar disorder (manic depression); or  · drug addiction or suicidal thoughts. Some young people have thoughts about suicide when first taking an antidepressant. Your doctor should check your progress at regular visits. Your family or other caregivers should also be alert to changes in your mood or symptoms. Taking an SSRI antidepressant during pregnancy may cause serious lung problems or other complications in the baby. However, you may have a relapse of depression if you stop taking your antidepressant. Tell your doctor right away if you become pregnant while taking escitalopram. Do not start or stop taking this medicine during pregnancy without your doctor's advice. Escitalopram can pass into breast milk and may harm a nursing baby. Tell your doctor if you are breast-feeding a baby. Escitalopram should not be given to a child younger than 15years old. How should I take escitalopram?  Follow all directions on your prescription label. Your doctor may occasionally change your dose. Do not take this medicine in larger or smaller amounts or for longer than recommended. You may take escitalopram with or without food. Try to take the medicine at the same time each day. Measure liquid medicine with the dosing syringe provided, or with a special dose-measuring spoon or medicine cup.  If Ritalin, and others; This list is not complete. Other drugs may interact with escitalopram, including prescription and over-the-counter medicines, vitamins, and herbal products. Not all possible interactions are listed in this medication guide. Where can I get more information? Your pharmacist can provide more information about escitalopram.  Remember, keep this and all other medicines out of the reach of children, never share your medicines with others, and use this medication only for the indication prescribed. Every effort has been made to ensure that the information provided by [address] is accurate, up-to-date, and complete, but no guarantee is made to that effect. Drug information contained herein may be time sensitive. [facility] information has been compiled for use by healthcare practitioners and consumers in the [facility] and therefore [facility] does not warrant that uses outside of the [facility] are appropriate, unless specifically indicated otherwise. [facility]'s drug information does not endorse drugs, diagnose patients or recommend therapy. [facility] information is an informational resource designed to assist licensed healthcare practitioners in caring for their patients and/or to serve consumers viewing this service as a supplement to, and not a substitute for, the expertise, skill, knowledge and judgment of healthcare practitioners. The absence of a warning for a given drug or drug combination in no way should be construed to indicate that the drug or drug combination is safe, effective or appropriate for any given patient. [facility] does not assume any responsibility for any aspect of healthcare administered with the aid of information [facility] provides. The information contained herein is not intended to cover all possible uses, directions, precautions, warnings, drug interactions, allergic reactions, or adverse effects.  If you have questions about the drugs you are taking,

## 2020-02-11 ENCOUNTER — OFFICE VISIT (OUTPATIENT)
Dept: FAMILY MEDICINE CLINIC | Age: 30
End: 2020-02-11
Payer: COMMERCIAL

## 2020-02-11 VITALS
BODY MASS INDEX: 42.21 KG/M2 | OXYGEN SATURATION: 97 % | HEIGHT: 69 IN | HEART RATE: 76 BPM | SYSTOLIC BLOOD PRESSURE: 128 MMHG | DIASTOLIC BLOOD PRESSURE: 86 MMHG | WEIGHT: 285 LBS

## 2020-02-11 PROCEDURE — 99214 OFFICE O/P EST MOD 30 MIN: CPT | Performed by: FAMILY MEDICINE

## 2020-02-11 PROCEDURE — G8482 FLU IMMUNIZE ORDER/ADMIN: HCPCS | Performed by: FAMILY MEDICINE

## 2020-02-11 PROCEDURE — 4004F PT TOBACCO SCREEN RCVD TLK: CPT | Performed by: FAMILY MEDICINE

## 2020-02-11 PROCEDURE — G8427 DOCREV CUR MEDS BY ELIG CLIN: HCPCS | Performed by: FAMILY MEDICINE

## 2020-02-11 PROCEDURE — G8417 CALC BMI ABV UP PARAM F/U: HCPCS | Performed by: FAMILY MEDICINE

## 2020-02-11 RX ORDER — QUETIAPINE FUMARATE 50 MG/1
50 TABLET, EXTENDED RELEASE ORAL NIGHTLY
Qty: 30 TABLET | Refills: 0 | Status: SHIPPED
Start: 2020-02-11 | End: 2020-03-11 | Stop reason: SDUPTHER

## 2020-02-11 RX ORDER — DEXTROAMPHETAMINE SACCHARATE, AMPHETAMINE ASPARTATE MONOHYDRATE, DEXTROAMPHETAMINE SULFATE AND AMPHETAMINE SULFATE 7.5; 7.5; 7.5; 7.5 MG/1; MG/1; MG/1; MG/1
30 CAPSULE, EXTENDED RELEASE ORAL EVERY MORNING
Qty: 30 CAPSULE | Refills: 0 | Status: SHIPPED | OUTPATIENT
Start: 2020-02-11 | End: 2020-03-11 | Stop reason: SDUPTHER

## 2020-02-11 RX ORDER — VENLAFAXINE HYDROCHLORIDE 37.5 MG/1
37.5 CAPSULE, EXTENDED RELEASE ORAL DAILY
Qty: 30 CAPSULE | Refills: 0 | Status: SHIPPED
Start: 2020-02-11 | End: 2020-03-11 | Stop reason: SDUPTHER

## 2020-02-11 ASSESSMENT — ENCOUNTER SYMPTOMS
DIARRHEA: 0
COUGH: 0
CONSTIPATION: 0
VOMITING: 0
ABDOMINAL PAIN: 0
NAUSEA: 0
SHORTNESS OF BREATH: 0

## 2020-02-11 NOTE — PROGRESS NOTES
2020     Samreen Rodgers (:  1990) is a 30 y.o. male, with a:  Past Medical History:   Diagnosis Date    ADHD (attention deficit hyperactivity disorder)     Class 3 severe obesity due to excess calories without serious comorbidity with body mass index (BMI) of 40.0 to 44.9 in adult [facility]) 2019    Sleep apnea     Tobacco abuse 4/3/2019       Here for evaluation of the following medical concerns:  Chief Complaint   Patient presents with    1 Month Follow-Up     labs done 2020     F/U of chronic problem(s) and new or recent complaint of depression   Chronic problems reviewed today include:  ADHD, tobacco abuse, subclinical hypothyroidism, and obesity  Current status of this/these condition(s):  stable  Tolerating meds: Yes    ADD/ADHD - Current treatment: Adderall XR, which has been somewhat effective. Residual symptoms: depressed mood. Medication side effects: anxiety. Patient denies nausea, vomiting, abdominal pain and insomnia. He had been diagnosed in childhood and had been on stimulant medication in the past. Nubia Tonie reports persistent worsening of his difficulty with focusing and completing tasks. Tobacco abuse - He was unable tolerate Wellbutrin. He was recently started on chantix and has noted a decrease in cravings. Is able to quit smoking for a brief period of time but unfortunately resumed due to social stressors     Subclinical hypothyroidism - recent TSH and T4 wnl      Obesity - has been attempting to eat better, he recently joined a gym    Depression / Mood Disorder-  He complains of depressed mood. Onset was approximately several months ago. Symptoms have been stable since that time. Current symptoms include: depressed mood and agitation. Patient denies suicidal thoughts with specific plan. Family history significant for no psychiatric illness. Possible organic causes contributing are: none. Risk factors: negative life event recent unsuccessful move to [place]. Previous treatment includes individual therapy. He complains of the following side effects from the treatment: agitation. Review of Systems   Constitutional: Negative for chills and fever. Respiratory: Negative for cough and shortness of breath. Cardiovascular: Negative for chest pain and leg swelling. Gastrointestinal: Negative for abdominal pain, constipation, diarrhea, nausea and vomiting. Genitourinary: Negative for difficulty urinating and dysuria. Neurological: Negative for headaches. Psychiatric/Behavioral: Positive for agitation and dysphoric mood. Negative for self-injury and sleep disturbance. The patient is nervous/anxious. Prior to Visit Medications    Medication Sig Taking? Authorizing Provider   amphetamine-dextroamphetamine (ADDERALL XR) 30 MG extended release capsule Take 1 capsule by mouth every morning for 30 days. Yes Gunjan Yates,    escitalopram (LEXAPRO) 10 MG tablet Take 1 tablet by mouth daily Yes Riaz Pierce DO   varenicline (CHANTIX) 1 MG tablet Take 1 tablet by mouth 2 times daily Yes Riaz Pierce DO   Prenatal Multivit-Min-Fe-FA (PRENATAL VITAMINS PO) Take 1 tablet by mouth daily Yes Historical Provider, MD        Social History     Tobacco Use    Smoking status: Current Some Day Smoker     Packs/day: 1.00     Years: 10.00     Pack years: 10.00    Smokeless tobacco: Never Used   Substance Use Topics    Alcohol use: Yes     Comment: social        Past Surgical History:   Procedure Laterality Date    FOOT SURGERY  2009    left foot       Vitals:    02/11/20 0915   BP: 128/86   Site: Left Upper Arm   Position: Sitting   Cuff Size: Large Adult   Pulse: 76   SpO2: 97%   Weight: 285 lb (129.3 kg)   Height: 5' 9.02\" (1.753 m)     Estimated body mass index is 42.07 kg/m² as calculated from the following:    Height as of this encounter: 5' 9.02\" (1.753 m). Weight as of this encounter: 285 lb (129.3 kg).     Physical Exam  Constitutional:       General: He is not in acute distress. Appearance: He is well-developed. He is obese. HENT:      Head: Normocephalic and atraumatic. Eyes:      Pupils: Pupils are equal, round, and reactive to light. Neck:      Musculoskeletal: Normal range of motion. Thyroid: No thyromegaly. Cardiovascular:      Rate and Rhythm: Normal rate and regular rhythm. Pulmonary:      Effort: Pulmonary effort is normal. No respiratory distress. Breath sounds: Normal breath sounds. No wheezing or rales. Abdominal:      General: There is no distension. Palpations: Abdomen is soft. Tenderness: There is no abdominal tenderness. Musculoskeletal:         General: No deformity. Skin:     General: Skin is warm. Findings: No rash. Neurological:      General: No focal deficit present. Mental Status: He is alert. Mental status is at baseline. Psychiatric:         Mood and Affect: Mood normal.         Behavior: Behavior normal.       ASSESSMENT/PLAN:  Julieta Ybarra was seen today for 1 month follow-up. Diagnoses and all orders for this visit:    Attention deficit hyperactivity disorder (ADHD), unspecified ADHD type  -     amphetamine-dextroamphetamine (ADDERALL XR) 30 MG extended release capsule; Take 1 capsule by mouth every morning for 30 days. Tobacco abuse    Class 3 severe obesity due to excess calories without serious comorbidity with body mass index (BMI) of 40.0 to 44.9 in adult (HCC)    Mild depression (HCC)  -     venlafaxine (EFFEXOR XR) 37.5 MG extended release capsule; Take 1 capsule by mouth daily    Elevated LFTs    Subclinical hypothyroidism    Mood disorder (HCC)  -     QUEtiapine (SEROQUEL XR) 50 MG extended release tablet; Take 1 tablet by mouth nightly       Additional plan and future considerations:   As above. Switch to venlafaxine and trial   seroquel. Encourage follow-up with counseling.  Return to office in 4 weeks or sooner if needed    Controlled substances monitoring: possible medication side effects, risk of tolerance and/or dependence, and alternative treatments discussed, no signs of potential drug abuse or diversion identified and [place] report reviewed today- activity consistent with treatment plan. Educational materials and/or home exercises printed for patient's review and were included in patient instructions on his/her After Visit Summary and given to patient at the end of visit. Counseled regarding above diagnosis, including possible risks and complications,  especially if left uncontrolled. Counseled regarding the possible side effects, risks, benefits and alternatives to treatment; patient and/or guardian verbalizes understanding, agrees, feels comfortable with and wishes to proceed with above treatment plan. Advised patient to call with any new medication issues, and read all Rx info from pharmacy to assure aware of all possible risks and side effects of medication before taking. Reviewed age and gender appropriate health screening exams and vaccinations. Advised patient regarding importance of keeping up with recommended health maintenance and to schedule as soon as possible if overdue, as this is important in assessing for undiagnosed pathology,especially cancer, as well as protecting against potentially harmful/life threatening disease. Patient and/or guardian verbalizes understanding and agrees with above counseling, assessment and plan. All questions answered.       Future Appointments   Date Time Provider Tanika Link   3/10/2020  8:45 AM [facility]         --DO Annabelle on 2/11/2020 at 9:19 AM

## 2020-02-11 NOTE — PATIENT INSTRUCTIONS
Patient Education        venlafaxine  Pronunciation:  JEAN PIERRE la fax een  Brand:  Effexor XR  What is the most important information I should know about venlafaxine? Do not use venlafaxine within 7 days before or 14 days after you have used an MAO inhibitor, such as isocarboxazid, linezolid, methylene blue injection, phenelzine, rasagiline, selegiline, or tranylcypromine. Some young people have thoughts about suicide when first taking an antidepressant. Stay alert to changes in your mood or symptoms. Report any new or worsening symptoms to your doctor. Do not stop using venlafaxine without first talking to your doctor. What is venlafaxine? Venlafaxine is a selective serotonin and norepinephrine reuptake inhibitor (SNRIs) antidepressant. Venlafaxine affects chemicals in the brain that may be unbalanced in people with depression. Venlafaxine is used to treat major depressive disorder, anxiety, and panic disorder. Venlafaxine may also be used for purposes not listed in this medication guide. What should I discuss with my healthcare provider before taking venlafaxine? You should not take this medicine if you are allergic to venlafaxine or desvenlafaxine (Pristiq). Do not use venlafaxine within 7 days before or 14 days after you have used an MAO inhibitor, such as isocarboxazid, linezolid, methylene blue injection, phenelzine, rasagiline, selegiline, or tranylcypromine. A dangerous drug interaction could occur. Some medicines can interact with venlafaxine and cause a serious condition called serotonin syndrome. Be sure your doctor knows if you also take stimulant medicine, opioid medicine, herbal products, or medicine for depression, mental illness, Parkinson's disease, migraine headaches, serious infections, or prevention of nausea and vomiting. Ask your doctor before making any changes in how or when you take your medications.    Tell your doctor if you have ever had:  · bipolar disorder (manic may affect a drug-screening urine test and you may have false results. Tell the laboratory staff that you use venlafaxine. Store at room temperature away from moisture and heat. What happens if I miss a dose? Take the medicine as soon as you can, but skip the missed dose if it is almost time for your next dose. Do not take two doses at one time. What happens if I overdose? Seek emergency medical attention or call the Poison Help line at 1-594.551.1270. What should I avoid while taking venlafaxine? Drinking alcohol with this medicine can cause side effects. Ask your doctor before taking a nonsteroidal anti-inflammatory drug (NSAID) such as aspirin, ibuprofen (Advil, Motrin), naproxen (Aleve), celecoxib (Celebrex), diclofenac, indomethacin, meloxicam, and others. Using an NSAID with venlafaxine may cause you to bruise or bleed easily. Avoid driving or hazardous activity until you know how this medicine will affect you. Your reactions could be impaired. What are the possible side effects of venlafaxine? Get emergency medical help if you have signs of an allergic reaction: skin rash or hives; difficulty breathing; swelling of your face, lips, tongue, or throat. Report any new or worsening symptoms to your doctor, such as: mood or behavior changes, anxiety, panic attacks, trouble sleeping, or if you feel impulsive, irritable, agitated, hostile, aggressive, restless, hyperactive (mentally or physically), more depressed, or have thoughts about suicide or hurting yourself.   Call your doctor at once if you have:  · blurred vision, tunnel vision, eye pain or swelling, or seeing halos around lights;  · easy bruising or bleeding (nosebleeds, bleeding gums), blood in your urine or stools, coughing up blood;  · cough, chest tightness, trouble breathing;  · a seizure (convulsions);  · low sodium level  --headache, confusion, slurred speech, severe weakness, vomiting, loss of coordination, feeling unsteady; or  · severe this and all other medicines out of the reach of children, never share your medicines with others, and use this medication only for the indication prescribed. Every effort has been made to ensure that the information provided by [address] is accurate, up-to-date, and complete, but no guarantee is made to that effect. Drug information contained herein may be time sensitive. [facility] information has been compiled for use by healthcare practitioners and consumers in the [place] and therefore [facility] does not warrant that uses outside of the [place] are appropriate, unless specifically indicated otherwise. [facility]'s drug information does not endorse drugs, diagnose patients or recommend therapy. [facility]'s drug information is an informational resource designed to assist licensed healthcare practitioners in caring for their patients and/or to serve consumers viewing this service as a supplement to, and not a substitute for, the expertise, skill, knowledge and judgment of healthcare practitioners. The absence of a warning for a given drug or drug combination in no way should be construed to indicate that the drug or drug combination is safe, effective or appropriate for any given patient. [facility] does not assume any responsibility for any aspect of healthcare administered with the aid of information [facility] provides. The information contained herein is not intended to cover all possible uses, directions, precautions, warnings, drug interactions, allergic reactions, or adverse effects. If you have questions about the drugs you are taking, check with your doctor, nurse or pharmacist.  Copyright 1338-6375 [employer] [address] [place] [address] Avenue: 15.01. Revision date: 5/4/2018. Care instructions adapted under license by [facility] ([facility]).  If you have questions about a medical condition or this instruction, always ask your healthcare professional. [place] [address] any warranty or liability for your use of this information. Patient Education        quetiapine  Pronunciation:  sandhya cotton  Brand:  SEROquel, SEROquel XR  What is the most important information I should know about quetiapine? Some people have thoughts about suicide while taking quetiapine. Stay alert to changes in your mood or symptoms. Report any new or worsening symptoms to your doctor. Quetiapine is not approved for use in older adults with dementia-related conditions. What is quetiapine? Quetiapine is an antipsychotic medicine that is used to treat schizophrenia in adults and children who are at least 15years old. Quetiapine is used to treat bipolar disorder (manic depression) in adults and children who are at least 8years old. Quetiapine is also used together with antidepressant medications to treat major depressive disorder in adults. Quetiapine may also be used for purposes not listed in this medication guide. What should I discuss with my healthcare provider before taking quetiapine? You should not use quetiapine if you are allergic to it. Quetiapine may increase the risk of death in older adults with dementia-related conditions and is not approved for this use. Quetiapine is not approved for use by anyone younger than 8years old. Tell your doctor if you have ever had:  · liver disease;  · heart problems;  · high or low blood pressure;  · low white blood cell (WBC) counts;  · abnormal thyroid tests or prolactin levels;  · constipation or urination problems;  · an enlarged prostate;  · a seizure;  · glaucoma or cataracts;  · high cholesterol or triglycerides;  · diabetes (in you or a family member); or  · trouble swallowing. Some people have thoughts about suicide while taking quetiapine. Your doctor will need to check your progress at regular visits. Your family or other caregivers should also be alert to changes in your mood or symptoms.   Taking antipsychotic medicine in the last 3 months of pregnancy may cause occur. Avoid driving or hazardous activity until you know how this medicine will affect you. Dizziness or drowsiness can cause falls, accidents, or severe injuries. Avoid getting up too fast from a sitting or lying position, or you may feel dizzy. Avoid becoming overheated or dehydrated during exercise and in hot weather. You may be more prone to heat stroke. What are the possible side effects of quetiapine? Get emergency medical help if you have signs of an allergic reaction (hives, difficult breathing, swelling in your face or throat) or a severe skin reaction (fever, sore throat, burning eyes, skin pain, red or purple skin rash with blistering and peeling). Report any new or worsening symptoms to your doctor, such as: mood or behavior changes, anxiety, panic attacks, trouble sleeping, or if you feel impulsive, irritable, agitated, hostile, aggressive, restless, hyperactive (mentally or physically), more depressed, or have thoughts about suicide or hurting yourself. Call your doctor at once if you have:  · twitching or uncontrollable movements of your eyes, lips, tongue, face, arms, or legs;  · mask-like appearance of the face, trouble swallowing, problems with speech;  · a light-headed feeling, like you might pass out;  · severe constipation;  · painful or difficult urination;  · blurred vision, tunnel vision, eye pain, or seeing halos around lights;  · severe nervous system reaction --very stiff (rigid) muscles, high fever, sweating, confusion, fast or uneven heartbeats, tremors, fainting;  · high blood sugar --increased thirst, increased urination, dry mouth, fruity breath odor; or  · low white blood cell counts --fever, chills, mouth sores, skin sores, sore throat, cough, trouble breathing, feeling light-headed.   Common side effects may include:  · speech problems;  · dizziness, drowsiness, tiredness;  · lack of energy;  · fast heartbeats;  · stuffy nose;  · increased appetite, weight gain;  · upset stomach, vomiting, constipation;  · dry mouth; or  · abnormal liver function tests. This is not a complete list of side effects and others may occur. Call your doctor for medical advice about side effects. You may report side effects to FDA at 2-345-FDA-4331. What other drugs will affect quetiapine? Sometimes it is not safe to use certain medications at the same time. Some drugs can affect your blood levels of other drugs you take, which may increase side effects or make the medications less effective. Quetiapine can cause a serious heart problem. Your risk may be higher if you also use certain other medicines for infections, asthma, heart problems, high blood pressure, depression, mental illness, cancer, malaria, or HIV. Many drugs can affect quetiapine. This includes prescription and over-the-counter medicines, vitamins, and herbal products. Not all possible interactions are listed here. Tell your doctor about all your current medicines and any medicine you start or stop using. Where can I get more information? Your pharmacist can provide more information about quetiapine. Remember, keep this and all other medicines out of the reach of children, never share your medicines with others, and use this medication only for the indication prescribed. Every effort has been made to ensure that the information provided by [address]jit Rojas Dr is accurate, up-to-date, and complete, but no guarantee is made to that effect. Drug information contained herein may be time sensitive. [facility] information has been compiled for use by healthcare practitioners and consumers in the [place] and therefore [employer] does not warrant that uses outside of the [place] are appropriate, unless specifically indicated otherwise. [facility] information does not endorse drugs, diagnose patients or recommend therapy.  [facility]'s drug information is an informational resource designed to assist licensed healthcare

## 2020-02-13 PROBLEM — F39 MOOD DISORDER (HCC): Status: ACTIVE | Noted: 2020-02-13

## 2020-03-11 ENCOUNTER — OFFICE VISIT (OUTPATIENT)
Dept: FAMILY MEDICINE CLINIC | Age: 30
End: 2020-03-11
Payer: COMMERCIAL

## 2020-03-11 VITALS
TEMPERATURE: 97 F | HEIGHT: 69 IN | WEIGHT: 278 LBS | OXYGEN SATURATION: 98 % | HEART RATE: 103 BPM | DIASTOLIC BLOOD PRESSURE: 72 MMHG | BODY MASS INDEX: 41.18 KG/M2 | SYSTOLIC BLOOD PRESSURE: 126 MMHG

## 2020-03-11 PROBLEM — F32.A MILD DEPRESSION: Status: ACTIVE | Noted: 2020-03-11

## 2020-03-11 PROCEDURE — G8482 FLU IMMUNIZE ORDER/ADMIN: HCPCS | Performed by: FAMILY MEDICINE

## 2020-03-11 PROCEDURE — 4004F PT TOBACCO SCREEN RCVD TLK: CPT | Performed by: FAMILY MEDICINE

## 2020-03-11 PROCEDURE — G8417 CALC BMI ABV UP PARAM F/U: HCPCS | Performed by: FAMILY MEDICINE

## 2020-03-11 PROCEDURE — 99214 OFFICE O/P EST MOD 30 MIN: CPT | Performed by: FAMILY MEDICINE

## 2020-03-11 PROCEDURE — G8427 DOCREV CUR MEDS BY ELIG CLIN: HCPCS | Performed by: FAMILY MEDICINE

## 2020-03-11 RX ORDER — QUETIAPINE FUMARATE 50 MG/1
50 TABLET, EXTENDED RELEASE ORAL NIGHTLY
Qty: 30 TABLET | Refills: 1 | Status: SHIPPED
Start: 2020-03-11 | End: 2020-05-12 | Stop reason: SDUPTHER

## 2020-03-11 RX ORDER — DEXTROAMPHETAMINE SACCHARATE, AMPHETAMINE ASPARTATE MONOHYDRATE, DEXTROAMPHETAMINE SULFATE AND AMPHETAMINE SULFATE 7.5; 7.5; 7.5; 7.5 MG/1; MG/1; MG/1; MG/1
30 CAPSULE, EXTENDED RELEASE ORAL EVERY MORNING
Qty: 30 CAPSULE | Refills: 0 | Status: SHIPPED | OUTPATIENT
Start: 2020-04-11 | End: 2020-05-12 | Stop reason: SDUPTHER

## 2020-03-11 RX ORDER — VENLAFAXINE HYDROCHLORIDE 75 MG/1
75 CAPSULE, EXTENDED RELEASE ORAL DAILY
Qty: 30 CAPSULE | Refills: 1 | Status: SHIPPED
Start: 2020-03-11 | End: 2020-05-12 | Stop reason: SDUPTHER

## 2020-03-11 RX ORDER — DEXTROAMPHETAMINE SACCHARATE, AMPHETAMINE ASPARTATE MONOHYDRATE, DEXTROAMPHETAMINE SULFATE AND AMPHETAMINE SULFATE 7.5; 7.5; 7.5; 7.5 MG/1; MG/1; MG/1; MG/1
30 CAPSULE, EXTENDED RELEASE ORAL EVERY MORNING
Qty: 30 CAPSULE | Refills: 0 | Status: SHIPPED | OUTPATIENT
Start: 2020-03-11 | End: 2020-03-11 | Stop reason: SDUPTHER

## 2020-03-11 ASSESSMENT — ENCOUNTER SYMPTOMS
COUGH: 0
DIARRHEA: 0
NAUSEA: 0
CONSTIPATION: 0
SHORTNESS OF BREATH: 0
ABDOMINAL PAIN: 0
VOMITING: 0

## 2020-03-11 NOTE — PROGRESS NOTES
3/11/2020     Beth Hobbs (:  1990) is a 30 y.o. male, with a:  Past Medical History:   Diagnosis Date    ADHD (attention deficit hyperactivity disorder)     Class 3 severe obesity due to excess calories without serious comorbidity with body mass index (BMI) of 40.0 to 44.9 in adult [facility]) 2019    Sleep apnea     Tobacco abuse 4/3/2019       Here for evaluation of the following medical concerns:  Chief Complaint   Patient presents with    ADHD     venlafaxine helping a little     F/U of chronic problem(s)  Chronic problems reviewed today include:  ADHD, tobacco abuse, depression and mood disorder, and BLADE  Current status of this/these condition(s):  stable  Tolerating meds: Yes    Doing well since last visit, recently bought a new house in [place] and started a new job and [employer]. ADD/ADHD - Current treatment: Adderall XR, which has been somewhat effective. Residual symptoms: depressed mood. Medication side effects: anxiety. Patient denies nausea, vomiting, abdominal pain and insomnia. He had been diagnosed in childhood and had been on stimulant medication in the past. Magaly Haq reports persistent worsening of his difficulty with focusing and completing tasks. Tobacco abuse - He was unable tolerate Wellbutrin. He was recently started on chantix and has noted a decrease in cravings. He continues on Chantix. He has decreased to a few cigarettes daily    Depression / Mood Disorder-  He complains of depressed mood. Onset was approximately several months ago. Symptoms have been stable since that time. Current symptoms include: depressed mood and agitation. Patient denies suicidal thoughts with specific plan. Family history significant for no psychiatric illness. Possible organic causes contributing are: none. Risk factors: negative life event recent unsuccessful move to [place]. Previous treatment includes individual therapy.  He complains of the following side effects from medication side effects, risk of tolerance and/or dependence, and alternative treatments discussed, no signs of potential drug abuse or diversion identified and OARRS report reviewed today- activity consistent with treatment plan. Educational materials and/or home exercises printed for patient's review and were included in patient instructions on his/her After Visit Summary and given to patient at the end of visit. Counseled regarding above diagnosis, including possible risks and complications,  especially if left uncontrolled. Counseled regarding the possible side effects, risks, benefits and alternatives to treatment; patient and/or guardian verbalizes understanding, agrees, feels comfortable with and wishes to proceed with above treatment plan. Advised patient to call with any new medication issues, and read all Rx info from pharmacy to assure aware of all possible risks and side effects of medication before taking. Reviewed age and gender appropriate health screening exams and vaccinations. Advised patient regarding importance of keeping up with recommended health maintenance and to schedule as soon as possible if overdue, as this is important in assessing for undiagnosed pathology,especially cancer, as well as protecting against potentially harmful/life threatening disease. Patient and/or guardian verbalizes understanding and agrees with above counseling, assessment and plan. All questions answered.       Future Appointments   Date Time Provider Tanika Link   5/12/2020  9:15 AM Lupe Gonzales DO [facility]         --Lupe Gonzales DO on 3/11/2020 at 9:59 AM

## 2020-05-12 ENCOUNTER — OFFICE VISIT (OUTPATIENT)
Dept: FAMILY MEDICINE CLINIC | Age: 30
End: 2020-05-12
Payer: COMMERCIAL

## 2020-05-12 VITALS
SYSTOLIC BLOOD PRESSURE: 110 MMHG | HEIGHT: 69 IN | TEMPERATURE: 98.4 F | RESPIRATION RATE: 16 BRPM | DIASTOLIC BLOOD PRESSURE: 78 MMHG | OXYGEN SATURATION: 98 % | BODY MASS INDEX: 42.51 KG/M2 | HEART RATE: 87 BPM | WEIGHT: 287 LBS

## 2020-05-12 PROCEDURE — G8427 DOCREV CUR MEDS BY ELIG CLIN: HCPCS | Performed by: FAMILY MEDICINE

## 2020-05-12 PROCEDURE — G8417 CALC BMI ABV UP PARAM F/U: HCPCS | Performed by: FAMILY MEDICINE

## 2020-05-12 PROCEDURE — 99214 OFFICE O/P EST MOD 30 MIN: CPT | Performed by: FAMILY MEDICINE

## 2020-05-12 PROCEDURE — 4004F PT TOBACCO SCREEN RCVD TLK: CPT | Performed by: FAMILY MEDICINE

## 2020-05-12 RX ORDER — M-VIT,TX,IRON,MINS/CALC/FOLIC 27MG-0.4MG
1 TABLET ORAL DAILY
COMMUNITY

## 2020-05-12 RX ORDER — DEXTROAMPHETAMINE SACCHARATE, AMPHETAMINE ASPARTATE MONOHYDRATE, DEXTROAMPHETAMINE SULFATE AND AMPHETAMINE SULFATE 7.5; 7.5; 7.5; 7.5 MG/1; MG/1; MG/1; MG/1
30 CAPSULE, EXTENDED RELEASE ORAL EVERY MORNING
Qty: 30 CAPSULE | Refills: 0 | Status: SHIPPED | OUTPATIENT
Start: 2020-07-12 | End: 2020-08-12 | Stop reason: SDUPTHER

## 2020-05-12 RX ORDER — DEXTROAMPHETAMINE SACCHARATE, AMPHETAMINE ASPARTATE MONOHYDRATE, DEXTROAMPHETAMINE SULFATE AND AMPHETAMINE SULFATE 7.5; 7.5; 7.5; 7.5 MG/1; MG/1; MG/1; MG/1
30 CAPSULE, EXTENDED RELEASE ORAL EVERY MORNING
Qty: 30 CAPSULE | Refills: 0 | Status: SHIPPED | OUTPATIENT
Start: 2020-05-12 | End: 2020-05-12 | Stop reason: SDUPTHER

## 2020-05-12 RX ORDER — DEXTROAMPHETAMINE SACCHARATE, AMPHETAMINE ASPARTATE MONOHYDRATE, DEXTROAMPHETAMINE SULFATE AND AMPHETAMINE SULFATE 7.5; 7.5; 7.5; 7.5 MG/1; MG/1; MG/1; MG/1
30 CAPSULE, EXTENDED RELEASE ORAL EVERY MORNING
Qty: 30 CAPSULE | Refills: 0 | Status: SHIPPED | OUTPATIENT
Start: 2020-06-12 | End: 2020-05-12 | Stop reason: SDUPTHER

## 2020-05-12 RX ORDER — VENLAFAXINE HYDROCHLORIDE 75 MG/1
75 CAPSULE, EXTENDED RELEASE ORAL DAILY
Qty: 30 CAPSULE | Refills: 2 | Status: SHIPPED
Start: 2020-05-12 | End: 2020-08-12 | Stop reason: SDUPTHER

## 2020-05-12 RX ORDER — QUETIAPINE FUMARATE 50 MG/1
50 TABLET, EXTENDED RELEASE ORAL NIGHTLY
Qty: 30 TABLET | Refills: 2 | Status: SHIPPED
Start: 2020-05-12 | End: 2020-08-12 | Stop reason: SDUPTHER

## 2020-05-12 ASSESSMENT — ENCOUNTER SYMPTOMS
VOMITING: 0
COUGH: 0
NAUSEA: 0
ABDOMINAL PAIN: 0
CONSTIPATION: 0
SHORTNESS OF BREATH: 0
DIARRHEA: 0

## 2020-08-12 ENCOUNTER — OFFICE VISIT (OUTPATIENT)
Dept: SLEEP MEDICINE | Age: 30
End: 2020-08-12
Payer: COMMERCIAL

## 2020-08-12 ENCOUNTER — OFFICE VISIT (OUTPATIENT)
Dept: FAMILY MEDICINE CLINIC | Age: 30
End: 2020-08-12
Payer: COMMERCIAL

## 2020-08-12 VITALS
WEIGHT: 290.9 LBS | OXYGEN SATURATION: 98 % | BODY MASS INDEX: 44.09 KG/M2 | HEART RATE: 77 BPM | RESPIRATION RATE: 16 BRPM | HEIGHT: 68 IN | SYSTOLIC BLOOD PRESSURE: 120 MMHG | DIASTOLIC BLOOD PRESSURE: 84 MMHG

## 2020-08-12 VITALS
BODY MASS INDEX: 44.41 KG/M2 | HEART RATE: 87 BPM | HEIGHT: 68 IN | TEMPERATURE: 96.9 F | RESPIRATION RATE: 16 BRPM | DIASTOLIC BLOOD PRESSURE: 84 MMHG | SYSTOLIC BLOOD PRESSURE: 120 MMHG | OXYGEN SATURATION: 97 % | WEIGHT: 293 LBS

## 2020-08-12 PROCEDURE — 99204 OFFICE O/P NEW MOD 45 MIN: CPT | Performed by: PHYSICIAN ASSISTANT

## 2020-08-12 PROCEDURE — G8417 CALC BMI ABV UP PARAM F/U: HCPCS | Performed by: PHYSICIAN ASSISTANT

## 2020-08-12 PROCEDURE — G8427 DOCREV CUR MEDS BY ELIG CLIN: HCPCS | Performed by: FAMILY MEDICINE

## 2020-08-12 PROCEDURE — 4004F PT TOBACCO SCREEN RCVD TLK: CPT | Performed by: PHYSICIAN ASSISTANT

## 2020-08-12 PROCEDURE — G8417 CALC BMI ABV UP PARAM F/U: HCPCS | Performed by: FAMILY MEDICINE

## 2020-08-12 PROCEDURE — G8427 DOCREV CUR MEDS BY ELIG CLIN: HCPCS | Performed by: PHYSICIAN ASSISTANT

## 2020-08-12 PROCEDURE — 99214 OFFICE O/P EST MOD 30 MIN: CPT | Performed by: FAMILY MEDICINE

## 2020-08-12 PROCEDURE — 4004F PT TOBACCO SCREEN RCVD TLK: CPT | Performed by: FAMILY MEDICINE

## 2020-08-12 RX ORDER — PANTOPRAZOLE SODIUM 20 MG/1
20 TABLET, DELAYED RELEASE ORAL
Qty: 30 TABLET | Refills: 2 | Status: SHIPPED
Start: 2020-08-12 | End: 2020-12-09

## 2020-08-12 RX ORDER — DEXTROAMPHETAMINE SACCHARATE, AMPHETAMINE ASPARTATE MONOHYDRATE, DEXTROAMPHETAMINE SULFATE AND AMPHETAMINE SULFATE 7.5; 7.5; 7.5; 7.5 MG/1; MG/1; MG/1; MG/1
30 CAPSULE, EXTENDED RELEASE ORAL EVERY MORNING
Qty: 30 CAPSULE | Refills: 0 | Status: SHIPPED | OUTPATIENT
Start: 2020-09-12 | End: 2020-08-12 | Stop reason: SDUPTHER

## 2020-08-12 RX ORDER — VARENICLINE TARTRATE 1 MG/1
1 TABLET, FILM COATED ORAL 2 TIMES DAILY
Qty: 60 TABLET | Refills: 2 | Status: SHIPPED
Start: 2020-08-12 | End: 2020-11-19 | Stop reason: SDUPTHER

## 2020-08-12 RX ORDER — VENLAFAXINE HYDROCHLORIDE 150 MG/1
150 CAPSULE, EXTENDED RELEASE ORAL DAILY
Qty: 30 CAPSULE | Refills: 2 | Status: SHIPPED
Start: 2020-08-12 | End: 2020-11-19 | Stop reason: SDUPTHER

## 2020-08-12 RX ORDER — DEXTROAMPHETAMINE SACCHARATE, AMPHETAMINE ASPARTATE MONOHYDRATE, DEXTROAMPHETAMINE SULFATE AND AMPHETAMINE SULFATE 7.5; 7.5; 7.5; 7.5 MG/1; MG/1; MG/1; MG/1
30 CAPSULE, EXTENDED RELEASE ORAL EVERY MORNING
Qty: 30 CAPSULE | Refills: 0 | Status: SHIPPED | OUTPATIENT
Start: 2020-10-12 | End: 2020-11-19 | Stop reason: SDUPTHER

## 2020-08-12 RX ORDER — DEXTROAMPHETAMINE SACCHARATE, AMPHETAMINE ASPARTATE MONOHYDRATE, DEXTROAMPHETAMINE SULFATE AND AMPHETAMINE SULFATE 7.5; 7.5; 7.5; 7.5 MG/1; MG/1; MG/1; MG/1
30 CAPSULE, EXTENDED RELEASE ORAL EVERY MORNING
Qty: 30 CAPSULE | Refills: 0 | Status: SHIPPED | OUTPATIENT
Start: 2020-08-12 | End: 2020-08-12 | Stop reason: SDUPTHER

## 2020-08-12 RX ORDER — QUETIAPINE FUMARATE 50 MG/1
50 TABLET, EXTENDED RELEASE ORAL NIGHTLY
Qty: 30 TABLET | Refills: 2 | Status: SHIPPED
Start: 2020-08-12 | End: 2020-11-19 | Stop reason: SDUPTHER

## 2020-08-12 ASSESSMENT — ENCOUNTER SYMPTOMS
VOMITING: 0
DIARRHEA: 0
ABDOMINAL PAIN: 1
COUGH: 0
NAUSEA: 0
SHORTNESS OF BREATH: 0
CONSTIPATION: 0

## 2020-08-12 ASSESSMENT — PATIENT HEALTH QUESTIONNAIRE - PHQ9
SUM OF ALL RESPONSES TO PHQ QUESTIONS 1-9: 2
SUM OF ALL RESPONSES TO PHQ QUESTIONS 1-9: 2
1. LITTLE INTEREST OR PLEASURE IN DOING THINGS: 1
SUM OF ALL RESPONSES TO PHQ9 QUESTIONS 1 & 2: 2
2. FEELING DOWN, DEPRESSED OR HOPELESS: 1

## 2020-08-12 NOTE — PATIENT INSTRUCTIONS
Patient Education        Gastroesophageal Reflux Disease (GERD): Care Instructions  Your Care Instructions     Gastroesophageal reflux disease (GERD) is the backward flow of stomach acid into the esophagus. The esophagus is the tube that leads from your throat to your stomach. A one-way valve prevents the stomach acid from backing up into this tube. When you have GERD, this valve does not close tightly enough. This can also cause pain and swelling in your esophagus (esophagitis). If you have mild GERD symptoms including heartburn, you may be able to control the problem with antacids or over-the-counter medicine. Changing your diet and eating habits, such as not eating late at night, losing weight, and making other lifestyle changes can also help reduce symptoms. Follow-up care is a key part of your treatment and safety. Be sure to make and go to all appointments, and call your doctor if you are having problems. It's also a good idea to know your test results and keep a list of the medicines you take. How can you care for yourself at home? · Take your medicines exactly as prescribed. Call your doctor if you think you are having a problem with your medicine. · Your doctor may recommend over-the-counter medicine. For mild or occasional indigestion, antacids, such as Tums, Gaviscon, Mylanta, or Maalox, may help. Your doctor also may recommend over-the-counter acid reducers, such as Pepcid AC (famotidine), Tagamet HB (cimetidine), or Prilosec (omeprazole). Read and follow all instructions on the label. If you use these medicines often, talk with your doctor. · Change your eating habits. ? It's best to eat several small meals instead of two or three large meals. ? After you eat, wait 2 to 3 hours before you lie down. ? Chocolate, mint, and alcohol can make GERD worse. ? Spicy foods, foods that have a lot of acid (like tomatoes and oranges), and coffee can make GERD symptoms worse in some people.  If your symptoms are worse after you eat a certain food, you may want to stop eating that food to see if your symptoms get better. · Do not smoke or chew tobacco. Smoking can make GERD worse. If you need help quitting, talk to your doctor about stop-smoking programs and medicines. These can increase your chances of quitting for good. · If you have GERD symptoms at night, raise the head of your bed 6 to 8 inches by putting the frame on blocks or placing a foam wedge under the head of your mattress. (Adding extra pillows does not work.)  · Do not wear tight clothing around your middle. · Lose weight if you need to. Losing just 5 to 10 pounds can help. When should you call for help? Call your doctor now or seek immediate medical care if:  · You have new or different belly pain. · Your stools are black and tarlike or have streaks of blood. Watch closely for changes in your health, and be sure to contact your doctor if:  · Your symptoms have not improved after 2 days. · Food seems to catch in your throat or chest.  Where can you learn more? Go to [email or web address]://[email or web address] org and sign in to your [employer] account. Enter Z231 in the Snapsort box to learn more about \"Gastroesophageal Reflux Disease (GERD): Care Instructions. \"     If you do not have an account, please click on the \"Sign Up Now\" link. Current as of: August 12, 2019               Content Version: 12.5  © 5070-7993 [employer]. Care instructions adapted under license by [facility] ([facility]). If you have questions about a medical condition or this instruction, always ask your healthcare professional. [address] any warranty or liability for your use of this information.

## 2020-08-12 NOTE — PROGRESS NOTES
2020     Izzy Branham (:  1990) is a 30 y.o. male, with a:  Past Medical History:   Diagnosis Date    ADHD (attention deficit hyperactivity disorder)     Class 3 severe obesity due to excess calories without serious comorbidity with body mass index (BMI) of 40.0 to 44.9 in adult [facility]) 2019    Depression     Sleep apnea     Tobacco abuse 4/3/2019       Here for evaluation of the following medical concerns:  Chief Complaint   Patient presents with    ADHD    Depression    Abdominal Pain     eepigastric pain after eating    Sleep Apnea     sees sleep medicine today      Doing well since last visit    Wife is at 25 weeks, started new job at [employer]    F/U of chronic problem(s) and recent complain of epigastric abdominal pain  Chronic problems reviewed today include:  ADHD, tobacco abuse, depression and mood disorder, and BLADE  Current status of this/these condition(s):  stable  Tolerating meds:         Yes    ADD/ADHD   Current treatment: Adderall XR, which has been effective. Recent medication changes: None  Medication side effects: none.      Depression / Mood Disorder  Current treatment: Venlafaxine 75 milligrams daily and Seroquel XR 50 mg nightly  Recent medication changes: None  Onset was approximately several months ago. Symptoms have been stable since that time. Current symptoms include: depressed mood and agitation but significantly improved. Patient denies suicidal thoughts with specific plan. Family history significant for no psychiatric illness. Possible organic causes contributing are: none. Risk factors: negative life event recent unsuccessful move to [address] [place] [address]. Previous treatment includes individual therapy and medication. He complains of the following side effects from the treatment: fatigue.      Tobacco abuse    Current treatment: Chantix  Recent medication changes: He was unable tolerate Wellbutrin  Still smoking, but continue to cut back    Review of Systems   Constitutional: Negative for chills and fever. Respiratory: Negative for cough and shortness of breath. Cardiovascular: Negative for chest pain and leg swelling. Gastrointestinal: Positive for abdominal pain. Negative for constipation, diarrhea, nausea and vomiting. Genitourinary: Negative for dysuria. Neurological: Negative for headaches. Psychiatric/Behavioral: Positive for agitation (improving), decreased concentration (improving) and dysphoric mood (improving). The patient is not nervous/anxious. Prior to Visit Medications    Medication Sig Taking? Authorizing Provider   Multiple Vitamins-Minerals (THERAPEUTIC MULTIVITAMIN-MINERALS) tablet Take 1 tablet by mouth daily Yes Historical Provider, MD   QUEtiapine (SEROQUEL XR) 50 MG extended release tablet Take 1 tablet by mouth nightly Yes Riaz Pierce DO   venlafaxine (EFFEXOR XR) 75 MG extended release capsule Take 1 capsule by mouth daily Yes Riaz Pierce DO   amphetamine-dextroamphetamine (ADDERALL XR) 30 MG extended release capsule Take 1 capsule by mouth every morning for 30 days. Yes Niles Stevenson DO   varenicline (CHANTIX) 1 MG tablet Take 1 tablet by mouth 2 times daily Yes Niles Stevenson DO        Social History     Tobacco Use    Smoking status: Current Some Day Smoker     Packs/day: 1.00     Years: 10.00     Pack years: 10.00    Smokeless tobacco: Never Used   Substance Use Topics    Alcohol use: Yes     Comment: social        Past Surgical History:   Procedure Laterality Date    FOOT SURGERY Left 2009       Vitals:    08/12/20 0830   BP: 120/84   Pulse: 87   Resp: 16   Temp: 96.9 °F (36.1 °C)   TempSrc: Temporal   SpO2: 97%   Weight: 293 lb (132.9 kg)   Height: 5' 8\" (1.727 m)     Estimated body mass index is 44.55 kg/m² as calculated from the following:    Height as of this encounter: 5' 8\" (1.727 m). Weight as of this encounter: 293 lb (132.9 kg).     Physical Exam  Constitutional: General: He is not in acute distress. Appearance: He is well-developed. He is obese. HENT:      Head: Normocephalic and atraumatic. Right Ear: External ear normal.      Left Ear: External ear normal.      Nose: Nose normal. No congestion or rhinorrhea. Eyes:      Extraocular Movements: Extraocular movements intact. Pupils: Pupils are equal, round, and reactive to light. Neck:      Musculoskeletal: Normal range of motion. Thyroid: No thyromegaly. Cardiovascular:      Rate and Rhythm: Normal rate and regular rhythm. Pulmonary:      Effort: Pulmonary effort is normal. No respiratory distress. Breath sounds: Normal breath sounds. No wheezing or rales. Abdominal:      General: There is no distension. Palpations: Abdomen is soft. Tenderness: There is no abdominal tenderness. Musculoskeletal:         General: No swelling or deformity. Skin:     General: Skin is warm. Findings: No rash. Neurological:      General: No focal deficit present. Mental Status: He is alert. Mental status is at baseline. Psychiatric:         Mood and Affect: Mood normal.         Behavior: Behavior normal.         ASSESSMENT/PLAN:  Mathew Cabral was seen today for adhd, depression, abdominal pain and sleep apnea. Diagnoses and all orders for this visit:    Attention deficit hyperactivity disorder (ADHD), unspecified ADHD type  -     Discontinue: amphetamine-dextroamphetamine (ADDERALL XR) 30 MG extended release capsule; Take 1 capsule by mouth every morning for 30 days. -     Discontinue: amphetamine-dextroamphetamine (ADDERALL XR) 30 MG extended release capsule; Take 1 capsule by mouth every morning for 30 days. -     amphetamine-dextroamphetamine (ADDERALL XR) 30 MG extended release capsule; Take 1 capsule by mouth every morning for 30 days. Mood disorder (HCC)  -     QUEtiapine (SEROQUEL XR) 50 MG extended release tablet;  Take 1 tablet by mouth nightly    Mild depression ([address]  - venlafaxine (EFFEXOR XR) 150 MG extended release capsule; Take 1 capsule by mouth daily    Tobacco abuse  -     varenicline (CHANTIX) 1 MG tablet; Take 1 tablet by mouth 2 times daily    Epigastric abdominal pain  -     pantoprazole (PROTONIX) 20 MG tablet; Take 1 tablet by mouth every morning (before breakfast)       Additional plan and future considerations:   As above. Increase venlafaxine and trial PPI. Encourage improved diet, regular exercise, and weight loss. Return office in 3 months or sooner if needed    Controlled substances monitoring: [place]AR[place] report reviewed today- activity consistent with treatment plan. Educational materials and/or home exercises printed for patient's review and were included in patient instructions on his/her After Visit Summary and given to patient at the end of visit. Counseled regarding above diagnosis, including possible risks and complications,  especially if left uncontrolled. Counseled regarding the possible side effects, risks, benefits and alternatives to treatment; patient and/or guardian verbalizes understanding, agrees, feels comfortable with and wishes to proceed with above treatment plan. Advised patient to call with any new medication issues, and read all Rx info from pharmacy to assure aware of all possible risks and side effects of medication before taking. Reviewed age and gender appropriate health screening exams and vaccinations. Advised patient regarding importance of keeping up with recommended health maintenance and to schedule as soon as possible if overdue, as this is important in assessing for undiagnosed pathology,especially cancer, as well as protecting against potentially harmful/life threatening disease. Patient and/or guardian verbalizes understanding and agrees with above counseling, assessment and plan. All questions answered.         Future Appointments   Date Time Provider Tanika Link   8/12/2020  9:30 AM Sarah Arauz

## 2020-08-12 NOTE — PROGRESS NOTES
[facility] Sleep Medicine     Patient Name: Radha Zuniga  Age: 30 y.o.   : 1990     Date of Service: 2020        HPI:   Radha Zuniga is a 30 y.o. morbidly obese male with medical history of ADHD, depression, tobacco abuse, ED, and BLADE who presents as a new patient to Sleep Clinic, referred by Dr. Krystal Ramires DO, for BLADE. He reports a history of snoring, gasping/choking for air during sleep, unrefreshing sleep, chronic daytime fatigue, difficulty staying asleep, and RLS.    He underwent an in lab PSG at [facility] in [facility] in 2016. He was prescribed CPAP 8 cm of water. His previous DME was [facility]. DME is now [facility] since 2020. He has an old CPAP from 2016 (does not use it all the time). Sometimes he falls asleep and forgets to put it on. He reports very loud snoring when he sleeps without his CPAP. He states that he has not received any new supplies in 1.5 years.      He uses a nasal pillow mask, size small (is a mouth breather with history of sinus issues). He denies any snoring when wearing CPAP. His current mask is 35 years old. On average, he wears his CPAP 3-4 times per month. He admits to taking it off at night without knowing sometimes. When wearing his CPAP he reports improved snoring, less frequent episodes of gasping for air during the night, and a deeper sleep. He sleeps in bed with two pillows, usually on his stomach or left side. He reports rare supine sleep. He states that he sometimes will  \"toss and turn. \"      He reports RLS symptoms about 2-4 times per month. He states that he shakes his leg (bounces his right knee, out of habit almost his whole life). Sometimes urge to stretch legs out in bed, he gets out of bed to stretch legs which helps.      He has gained about 15 pounds since his last sleep study in .      He is still smoking 1/2 pack of cigarettes per day. He is trialing Chantix. Denies any vivid dreams at this time. No dream enactment behavior.       STOP-BANG score of 6/8 for  (+)snoring, (+)daytime fatigue, (+)observed apneas, (-)high blood pressure, (+)BMI>35, (-)age >47, (+)neck circumference >15in, (+)male gender     Sleep History:  Time in bed: 11 pm  Lights out time: 11 pm  Time to fall asleep: 5 minutes or less  Nighttime awakenings: 3-4 times per night   Waking time: 7:30-9 am  Time out of bed in morning: upon awaking  Estimated time spent in bed: 9-10 hours  Estimated time spent asleep: 5-6 hours  Daytime naps: Rarely     Bed partner: wife  Symptoms witnessed:  +snoring, +apneas, +personally experienced choking and gasping  RLS symptoms: 2-4 times per month  Cataplexy and sleep paralysis: None reported     Current employment: [employer]  Drowsy driving: occasionally but never falls asleep at the wheel  Caffeine use: 2 cups of espresso in the morning; Monster energy drink 1-2 per day - last before 6 pm  Nicotine use:  0.5-1 ppd x 12 years  Alcohol use: social, few times a month     Sleep study history: 2016 at [facility] in [address] aides: None     [place] Sleepiness Scale score of 16/24 (scores of 10 or higher are considered indicative of excessive daytime sleepiness)     [facility] Depression Scale (MARILOU-D) score of 28/60  (scores of 16 or higher may be suggestive of depressive or mood-related issues).     PMH:  Past Medical History        Past Medical History:   Diagnosis Date    ADHD (attention deficit hyperactivity disorder)      Class 3 severe obesity due to excess calories without serious comorbidity with body mass index (BMI) of 40.0 to 44.9 in adult ([facility] [address] 4/30/2019    Depression      Sleep apnea      Tobacco abuse 4/3/2019            PSH:  Past Surgical History         Past Surgical History:   Procedure Laterality Date    FOOT SURGERY Left 2009         No hx of adenotonsillectomy     Soc Hx:  Social History            Tobacco Use    Smoking status: Current Some Day Smoker       Packs/day: 1.00       Years: 10.00       Pack years: 10.00    Smokeless tobacco: Never Used   Substance Use Topics    Alcohol use: Yes       Comment: social    Drug use: No         Fam Hx:  History of BLADE or insomnia- Father has BLADE  Family History         Family History   Problem Relation Age of Onset    Heart Disease Mother      Heart Attack Father      Heart Disease Father      No Known Problems Sister              Medications:   Current Facility-Administered Medications          Current Outpatient Medications   Medication Sig Dispense Refill    QUEtiapine (SEROQUEL XR) 50 MG extended release tablet Take 1 tablet by mouth nightly 30 tablet 2    varenicline (CHANTIX) 1 MG tablet Take 1 tablet by mouth 2 times daily 60 tablet 2    venlafaxine (EFFEXOR XR) 150 MG extended release capsule Take 1 capsule by mouth daily 30 capsule 2    [START ON 10/12/2020] amphetamine-dextroamphetamine (ADDERALL XR) 30 MG extended release capsule Take 1 capsule by mouth every morning for 30 days. 30 capsule 0    pantoprazole (PROTONIX) 20 MG tablet Take 1 tablet by mouth every morning (before breakfast) 30 tablet 2    Multiple Vitamins-Minerals (THERAPEUTIC MULTIVITAMIN-MINERALS) tablet Take 1 tablet by mouth daily          No current facility-administered medications for this visit.             Allergies:   has No Known Allergies.     Review of Systems:  Constitutional: + for fatigue. +15- 20 pound weight gain in past 4 years. HENT: Negative for congestion or postnasal drip. Negative for trouble swallowing.         No dentures.   Respiratory: Negative for cough. Negative for shortness of breath.    Cardiovascular: Negative for chest pain. Negative for palpitations. Negative for leg swelling. Gastrointestinal: Negative for GERD. Negative for abdominal pain. Genitourinary: Negative for nocturia. Negative for hematuria. Musculoskeletal: Negative for musculoskeletal pain affecting sleep. No focal motor deficits. Skin: Negative for rash. Negative for pruritus. Neurological: Negative for headaches. Negative for seizures. Psychiatric/Behavioral: Negative for dysphoric mood. Negative for sleep disturbance.        Objective:   Physical Exam:  /84 (Position: Sitting)   Pulse 77   Resp 16   Ht 5' 8\" (1.727 m)   Wt 290 lb 14.4 oz (132 kg)   SpO2 98%   BMI 44.23 kg/m²       Vitals       Additional Measurements     08/12/20 0918   Neck circumference: 17.5           Constitutional: A well developed, well nourished 30 y.o. male who is alert, oriented, cooperative and in no apparent distress. Skin: Intact, no rash  Head: Normocephalic, atraumatic  Eyes: Pupils are equal, round, and reactive to light. No conjunctival erythema  ENT:  No nasal deformity. No oropharyngeal exudate. Does not have dentures. Neck: Supple, normal range of motion, trachea midline  Lungs: Clear to auscultation bilaterally. No wheezes, rales, or rhonchi. Cardiac: Regular rate and rhythm, +S1S2, no murmurs apparent  Extremities: Moves all extremities x 4, no lower extremity edema  Neurologic: No focal motor deficits apparent,  Alert and oriented x 3  Psychiatric: Behavior normal. Normal mood and affect.        PROCEDURE HISTORY:   PSG 6/14/2016 at [facility] (wt 274 lb):  min,  min, PLM index 4.2>> trialed CPAP 5 cm of water- 9 cm of water. Prescribed CPAP 8 cm of water.         PERTINENT LAB RESULTS:        TSH   Date Value Ref Range Status   01/08/2020 3.050 0.270 - 4.200 uIU/mL Final      No results found for: FERRITIN      Assessment & Plan:      Vannessa Thao is a 30 y.o. morbidly obese male with medical history of ADHD, depression, tobacco abuse, ED, and BLADE who presents as a new patient to Sleep Clinic, referred by Dr. Young Vargas DO, for BLADE. He reports a history of snoring, gasping/choking for air during sleep, unrefreshing sleep, chronic daytime fatigue, difficulty staying asleep, and RLS.   He notes benefit with CPAP therapy (namely, more consolidated and refreshing sleep, more energy during the day, resolution of snoring and gasping/choking for air during sleep) and is motivated to continue use.        1. Obstructive Sleep Apnea     · Multiple risk factors with STOP-BANG 6/8. 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 He has gained 15 pounds since his last PSG and has not received new supplies/mask for 1.6 years  · His DME is [facility]. · Per [facility], Patient is eligible for a new Auto CPAP (order for Auto CPAP 5-20 cm of water will be placed and faxed to [facility])  · Patient is requesting either [employer]Wear FFM or nasal mask (nasal pillow mask irritated his nares sometimes and caused dryness). [facility] was faxed order for mask refitting. · Will check patient's settings remotely in about 4-6 weeks to ensure proper treatment pressures  · Discussed pathophysiology of BLADE and its impact on daily well-being, as well as cardiometabolic and neurocognitive health (particularly in moderate-severe cases). · Discussed CPAP as first-line and gold-standard therapy for BLADE. Patient understands that CPAP should be worn every night for the duration of the night (in order to not miss therapy during early-morning REM period) for maximum benefit. Reviewed Medicaid requirements for compliance (>70% of nights for >/=4 hours nightly in first 90 days). · Counseled on risks of driving while drowsy. · Provided handouts on BLADE, CPAP, and sleep hygiene.        2. Excessive Daytime Sleepiness     · Elevated [place] 16/24. Likely secondary to untreated BLADE, as above. Will assess for improvement with PAP therapy. · Counseled on risks of driving while drowsy. Recommended a short, 10-15 min power nap (in a safe location, with car doors locked) as most effective tool if experiencing drowsiness while driving.        3. Chronic Sleep Maintenance Insomnia     · Patient reports difficulty staying asleep.  May be multifactorial: poor sleep hygiene + untreated BLADE + underlying anxiety. · Will assess for improvement with PAP therapy and consider Cognitive Behavioral Therapy for Insomnia if persistent.        4. Morbid Obesity (Body mass index is 44.23 kg/m².)     · Discussed impact of weight gain on BLADE severity. Patient understands that BLADE severity may improve with weight loss but no guarantee of cure can be made. Encouraged weight loss efforts.        5. Restless Leg Syndrome      · Mild in severity, symptoms occur 2-4 times per month on average. · Consider checking ferritin if symptoms persistent despite conservative management and treat with iron supplementation if <50.   · Discussed conservative management with pre-bedtime leg stretching and ambulation           Patient will follow-up in 3 months or sooner if any issues arise     A total of 60 minutes' time was spent with the patient, of which >50% was spent in face-to-face discussion and counseling.               Wilder Gr PA-C  [facility] Sleep Medicine

## 2020-08-12 NOTE — PATIENT INSTRUCTIONS
yourself back to sleep. 4. Alcohol: Avoid Alcohol After Dinner   Alcohol often promotes the onset of sleep, but as alcohol is metabolized during the night, sleep becomes disturbed and fragmented, leading to poor sleep quality.  Limit alcohol use to 1 beer or glass of wine per day for women, 2  drinks per day for men. 5. Sleeping Pills: Sleep Medications are Effective Only Temporarily   Research has shown that sleep medications lose their effectiveness in about 2 - 4 weeks when taken regularly.  Over time, sleeping pills actually can make sleep problems worse. When sleeping pills have been used over a long period, withdrawal from the medication can lead to an insomnia rebound. Thus, after long- term use, many individuals incorrectly conclude that they need sleeping pills in order to sleep normally.  Keep use of sleep meds infrequent, but dont worry if you need to use one on an occasional basis. (And always consult with your doctor first if you decide to make changes to your medication regimen.)     6. Regular Exercise   Exercise has been shown to aid sleep, although the positive effect often takes several weeks to become noticeable.  However, exercise earlier in the day if possible. Exercise within 2 hours of bedtime may elevate nervous system activity and interfere with sleep onset.  Get regular exercise, preferably at least 20 minutes each day of an activity that causes sweating. 7. Hot Baths   Spending 20 minutes in a tub of hot water an hour or two prior to bedtime may promote sleep. 8.Bedroom Environment: Moderate Temperature, Quiet, and Dark   Extremes of heat or cold can disrupt sleep. Keep your room at a comfortable temperature.  Noises can be masked with background white noise (such as the noise of a fan) or with earplugs.  Bedrooms may be darkened with black-out shades or sleep masks can be worn.    Position clocks out-of-sight since clock-watching can increase worry It's also a good idea to know your test results and keep a list of the medicines you take. How can you care for yourself at home? · Lose weight, if needed. It may reduce the number of times you stop breathing or have slowed breathing. · Sleep on your side. It may stop mild apnea. If you tend to roll onto your back, sew a pocket in the back of your pajama top. Put a tennis ball into the pocket, and stitch the pocket shut. This will help keep you from sleeping on your back. · Avoid alcohol and medicines such as sleeping pills and sedatives before bed. · Do not smoke. Smoking can make sleep apnea worse. If you need help quitting, talk to your doctor about stop-smoking programs and medicines. These can increase your chances of quitting for good. · Prop up the head of your bed 4 to 6 inches by putting bricks under the legs of the bed. · Treat breathing problems, such as a stuffy nose, caused by a cold or allergies. · Try a continuous positive airway pressure (CPAP) breathing machine if your doctor recommends it. The machine keeps your airway open when you sleep. · If CPAP does not work for you, ask your doctor if you can try other breathing machines. A bilevel positive airway pressure machine uses one type of air pressure for breathing in and another type for breathing out. Another device raises or lowers air pressure as needed while you breathe. · Talk to your doctor if:  ? Your nose feels dry or bleeds when you use one of these machines. You may need to increase moisture in the air. A humidifier may help. ? Your nose is runny or stuffy from using a breathing machine. Decongestants or a corticosteroid nasal spray may help. ? You are sleepy during the day and it gets in the way of the normal things you do. Do not drive when you are drowsy. When should you call for help?   Watch closely for changes in your health, and be sure to contact your doctor if:  · You still have sleep apnea even though you have made lifestyle changes. · You are thinking of trying a device such as CPAP. · You are having problems using a CPAP or similar machine. Where can you learn more? Go to https://zeenworldpepicMixamoeb.[employer]. org and sign in to your [employer] account. Enter I540 in the [employer] [facility] box to learn more about \"Sleep Apnea: Care Instructions. \"     If you do not have an account, please click on the \"Sign Up Now\" link. Current as of: February 24, 2020               Content Version: 12.5  © 8618-7146 Healthwise, Incorporated. Care instructions adapted under license by [facility] ([facility]). If you have questions about a medical condition or this instruction, always ask your healthcare professional. [address] any warranty or liability for your use of this information.

## 2020-08-13 ENCOUNTER — TELEPHONE (OUTPATIENT)
Dept: SLEEP MEDICINE | Age: 30
End: 2020-08-13

## 2020-08-14 ENCOUNTER — TELEPHONE (OUTPATIENT)
Dept: SLEEP MEDICINE | Age: 30
End: 2020-08-14

## 2020-08-17 NOTE — PROGRESS NOTES
[facility] Sleep Medicine    Patient Name: Ming Grover  Age: 30 y.o.   : 1990    Date of Service: 2020      HPI:   Ming Grover is a 30 y.o. morbidly obese male with medical history of ADHD, depression, tobacco abuse, ED, and BLADE who presents as a new patient to Sleep Clinic, referred by Dr. Ruth Wilson DO, for BLADE. He reports a history of snoring, gasping/choking for air during sleep, unrefreshing sleep, chronic daytime fatigue, difficulty staying asleep, and RLS. He underwent an in lab PSG at [facility] in [facility] in 2016. He was prescribed CPAP 8 cm of water. His previous DME was [facility]. DME is now [facility] since 2020. He has an old CPAP from 2016 (does not use it all the time). Sometimes he falls asleep and forgets to put it on. He reports very loud snoring when he sleeps without his CPAP. He states that he has not received any new supplies in 1.5 years. He uses a nasal pillow mask, size small (is a mouth breather with history of sinus issues). He denies any snoring when wearing CPAP. His current mask is 35 years old. On average, he wears his CPAP 3-4 times per month. He admits to taking it off at night without knowing sometimes. When wearing his CPAP he reports improved snoring, less frequent episodes of gasping for air during the night, and a deeper sleep. He sleeps in bed with two pillows, usually on his stomach or left side. He reports rare supine sleep. He states that he sometimes will  \"toss and turn. \"     He reports RLS symptoms about 2-4 times per month. He states that he shakes his leg (bounces his right knee, out of habit almost his whole life). Sometimes urge to stretch legs out in bed, he gets out of bed to stretch legs which helps. He has gained about 15 pounds since his last sleep study in . He is still smoking 1/2 pack of cigarettes per day. He is trialing Chantix. Denies any vivid dreams at this time. No dream enactment behavior.        STOP-BANG score of 6/8 for  (+)snoring, (+)daytime fatigue, (+)observed apneas, (-)high blood pressure, (+)BMI>35, (-)age >47, (+)neck circumference >15in, (+)male gender    Sleep History:  Time in bed: 11 pm  Lights out time: 11 pm  Time to fall asleep: 5 minutes or less  Nighttime awakenings: 3-4 times per night   Waking time: 7:30-9 am  Time out of bed in morning: upon awaking  Estimated time spent in bed: 9-10 hours  Estimated time spent asleep: 5-6 hours  Daytime naps: Rarely    Bed partner: wife  Symptoms witnessed:  +snoring, +apneas, +personally experienced choking and gasping  RLS symptoms: 2-4 times per month  Cataplexy and sleep paralysis: None reported    Current employment: [employer] agent  Drowsy driving: occasionally but never falls asleep at the wheel  Caffeine use: 2 cups of espresso in the morning; Monster energy drink 1-2 per day - last before 6 pm  Nicotine use:  0.5-1 ppd x 12 years  Alcohol use: social, few times a month    Sleep study history: [facility] at [facility] in [address] aides: None    [place] Sleepiness Scale score of 16/24 (scores of 10 or higher are considered indicative of excessive daytime sleepiness)    [facility] Depression Scale (MARILOU-D) score of 28/60  (scores of 16 or higher may be suggestive of depressive or mood-related issues).     PMH:  Past Medical History:   Diagnosis Date    ADHD (attention deficit hyperactivity disorder)     Class 3 severe obesity due to excess calories without serious comorbidity with body mass index (BMI) of 40.0 to 44.9 in adult [facility]) 4/30/2019    Depression     Sleep apnea     Tobacco abuse 4/3/2019        PSH:  Past Surgical History:   Procedure Laterality Date    FOOT SURGERY Left 2009      No hx of adenotonsillectomy    Soc Hx:  Social History     Tobacco Use    Smoking status: Current Some Day Smoker     Packs/day: 1.00     Years: 10.00     Pack years: 10.00    Smokeless tobacco: Never Used   Substance Use Topics    Alcohol STOP-BANG 6/8. 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 He has gained 15 pounds since his last PSG and has not received new supplies/mask for 1.6 years   His D[facility].  Per [facility], Patient is eligible for a new Auto CPAP (order for Auto CPAP 5-20 cm of water will be placed and faxed to [facility])   Patient is requesting either [employer]ear FFM or nasal mask (nasal pillow mask irritated his nares sometimes and caused dryness). [facility] was faxed order for mask refitting.  Will check patient's settings remotely in about 4-6 weeks to ensure proper treatment pressures   Discussed pathophysiology of BLADE and its impact on daily well-being, as well as cardiometabolic and neurocognitive health (particularly in moderate-severe cases).  Discussed CPAP as first-line and gold-standard therapy for BLADE. Patient understands that CPAP should be worn every night for the duration of the night (in order to not miss therapy during early-morning REM period) for maximum benefit. Reviewed Medicaid requirements for compliance (>70% of nights for >/=4 hours nightly in first 90 days).  Counseled on risks of driving while drowsy.  Provided handouts on BLADE, CPAP, and sleep hygiene. 2. Excessive Daytime Sleepiness      Elevated [place] 16/24. Likely secondary to untreated BLADE, as above. Will assess for improvement with PAP therapy.  Counseled on risks of driving while drowsy. Recommended a short, 10-15 min power nap (in a safe location, with car doors locked) as most effective tool if experiencing drowsiness while driving. 3. Chronic Sleep Maintenance Insomnia      Patient reports difficulty staying asleep. May be multifactorial: poor sleep hygiene + untreated BLADE + underlying anxiety.  Will assess for improvement with PAP therapy and consider Cognitive Behavioral Therapy for Insomnia if persistent.        4. Morbid Obesity (Body mass index is 44.23 kg/m².)      Discussed impact of weight gain on BLADE severity. Patient understands that BLADE severity may improve with weight loss but no guarantee of cure can be made. Encouraged weight loss efforts. 5. Restless Leg Syndrome       Mild in severity, symptoms occur 2-4 times per month on average.  Consider checking ferritin if symptoms persistent despite conservative management and treat with iron supplementation if <50.  Discussed conservative management with pre-bedtime leg stretching and ambulation        Patient will follow-up in 3 months or sooner if any issues arise    A total of 60 minutes' time was spent with the patient, of which >50% was spent in face-to-face discussion and counseling.        Lawrence Johnson PA-C  [facility] Sleep Medicine

## 2020-10-07 ENCOUNTER — TELEPHONE (OUTPATIENT)
Dept: SLEEP MEDICINE | Age: 30
End: 2020-10-07

## 2020-10-07 NOTE — TELEPHONE ENCOUNTER
Spoke with pt re compliance with CPAP--he did state he is using it without issue--I let him know that we have not been able to see any data for usage on computer--asked that he call [facility] and troubleshoot with them why his machine is not downloading data--explained that this is important as insurance uses this info to justify paying for the CPAP and will take it back if he is showing no data usage--pt understood and will call [facility]--I will also notify [facility] that I spoke with pt

## 2020-10-19 ENCOUNTER — TELEPHONE (OUTPATIENT)
Dept: SLEEP MEDICINE | Age: 30
End: 2020-10-19

## 2020-11-17 ENCOUNTER — OFFICE VISIT (OUTPATIENT)
Dept: SLEEP MEDICINE | Age: 30
End: 2020-11-17
Payer: COMMERCIAL

## 2020-11-17 VITALS
HEART RATE: 90 BPM | DIASTOLIC BLOOD PRESSURE: 84 MMHG | HEIGHT: 68 IN | OXYGEN SATURATION: 98 % | RESPIRATION RATE: 16 BRPM | SYSTOLIC BLOOD PRESSURE: 120 MMHG | WEIGHT: 310.2 LBS | BODY MASS INDEX: 47.01 KG/M2

## 2020-11-17 PROCEDURE — 99214 OFFICE O/P EST MOD 30 MIN: CPT | Performed by: STUDENT IN AN ORGANIZED HEALTH CARE EDUCATION/TRAINING PROGRAM

## 2020-11-17 RX ORDER — ACETAMINOPHEN 325 MG/1
650 TABLET ORAL EVERY 6 HOURS PRN
COMMUNITY

## 2020-11-17 ASSESSMENT — SLEEP AND FATIGUE QUESTIONNAIRES
HOW LIKELY ARE YOU TO NOD OFF OR FALL ASLEEP WHILE LYING DOWN TO REST IN THE AFTERNOON WHEN CIRCUMSTANCES PERMIT: 3
ESS TOTAL SCORE: 13
HOW LIKELY ARE YOU TO NOD OFF OR FALL ASLEEP WHILE WATCHING TV: 3
HOW LIKELY ARE YOU TO NOD OFF OR FALL ASLEEP WHEN YOU ARE A PASSENGER IN A CAR FOR AN HOUR WITHOUT A BREAK: 1
HOW LIKELY ARE YOU TO NOD OFF OR FALL ASLEEP IN A CAR, WHILE STOPPED FOR A FEW MINUTES IN TRAFFIC: 0
HOW LIKELY ARE YOU TO NOD OFF OR FALL ASLEEP WHILE SITTING QUIETLY AFTER LUNCH WITHOUT ALCOHOL: 2
HOW LIKELY ARE YOU TO NOD OFF OR FALL ASLEEP WHILE SITTING AND READING: 1
HOW LIKELY ARE YOU TO NOD OFF OR FALL ASLEEP WHILE SITTING INACTIVE IN A PUBLIC PLACE: 3
HOW LIKELY ARE YOU TO NOD OFF OR FALL ASLEEP WHILE SITTING AND TALKING TO SOMEONE: 0
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
## 2020-11-17 NOTE — PATIENT INSTRUCTIONS
Restless Leg Syndrome     Avoid  - Caffeinated beverages  - Alcohol  - Tobacco     Consider  -Massage  -Excercise  -Stretching  -Warm Baths  -A Relaxis Pad (Non-Pharmaceutical treatment for RLS)        Try  -Maintaining a good sleep schedule and good sleep hygiene  -Massaging the area of discomfort     Learn More at:  iDoc24           It was a pleasure seeing you today Radha Upton! Summary of Today's Visit      Please consider CPAP pillow to help you sleep more comfortably. Please call our sleep nurses to schedule a follow up at - [phone] option 2              1. Continue using your machine nightly     ------------Please bring your machine/Data Card to every visit. -------------     -Request all data downloads be sent to my nurse        2. Contact your DME company about supplies or issues with your machine. As a general guideline, please replace your:  -CPAP mask every 3-6 months  -CPAP hose  every 3-6 months  -Filter every 2-4 weeks  -CPAP/BiPAP/ASV replacements every 5-7+ years     3. Continue healthy weight loss/stabilization, as this is recommended as a long-term intervention. 4. Please do not drive or operate machinery when you feel fatigued/sleepy/drowsy      5. Please try to sleep between 6-8 hours nightly with the CPAP mask    6. Please avoid sedatives, alcohol and caffeinated drinks at/near bed time. 7. Please call your supply (DME) company with any issues with your PAP device. Please call our office with any issues pertaining to the therapy.   ----Please note that complications of BLADE if not treated can increase risk for: systemic hypertension, pulmonary hypertension, cardiovascular morbidities (heart attack and stroke), car accidents and all cause mortality.            For general questions or scheduling issues, call my nurse at [phone] option 999[address] [facility], [address] [place] [phone] option [address] [place] ----------------------------------------------------------  Common Issues and Solutions     Pillow is dislodging mask - Consider getting a CPAP pillow or switching to a mask with the hose at the top. Dry Mouth - Adjust Humidity and/or try Biotene Gel. (Excessive leak can also cause this)     Leak - Please call your equipment provider  as they may need to adjust your mask. Difficulty tolerating the PAP mask - Contact your equipment company to try a different mask, we can order a \"mini sleep study\"with the sleep tech to try different masks/settings of pressure, also we have a sleep psychologist to help with anxiety related to wearing the PAP mask      ----------------------------------------------------------     For Questions and Concerns: In case of difficulty with your PAP device or mask interface, please FIRST contact your 2 [address] (The company who provides you the CPAP/BiPAP/ASV machine/supplies). If you need the number for any other DME company, please call my Nurse at [phone] option 2     For questions concerning your [place] appointment: Call [phone] option 2  SLEEP LAB SCHEDULING:        ----------------------------------------------------------     Helpful Web Sites: For patients with ALL SLEEP DISORDERS: American Academy of Sleep Medicine http://sleepeducation. org; or AFFiRiS: https://sleepfoundation. org  For patients with BLADE: American Sleep Apnea Association: AdminParking.Conversant Labs. org  For patients with RLS: RLS Foundation: Silvio  For patients with INSOMNIA: https://www.joyce.org/. org/articles/sleep/insomnia-causes-and-cures. htm  For patients with DEPRESSION: ResumeQuery.com.ee. Nordicplan/depression            Learning About CPAP for Sleep Apnea  What is CPAP? CPAP/Bi-PAP is a small machine that you use at home every night while you sleep. It increases air pressure in your throat to keep your airway open.  When you have sleep apnea, this can help you sleep better so you feel much better. CPAP stands for \"continuous positive airway pressure. \" Bi-PAP is a different PAP setting that allows for different pressures when you breathe in and out. The CPAP/Bi-PAP machine will have one of the following:  · A mask that covers your nose and mouth  · Prongs that fit into your nose  · A mask that covers your nose only, the most common type. This type is called NCPAP. The N stands for \"nasal.\"  Why is it done? CPAP is a common/effective treatment for obstructive sleep apnea. How does it help? · CPAP can help you have more normal sleep, so you feel less sleepy and more alert during the daytime through the treatment of sleep apnea. · CPAP may help keep heart failure or other heart problems from getting worse. · CPAP may help lower your blood pressure. · If you use CPAP, your bed partner may also sleep better because you are snoring less. What are the side effects? Some people who use CPAP have:  · A dry or stuffy nose and a sore throat. · Irritated skin on the face. · Sore eyes. · Bloating. If you have any of these problems, work with your doctor to fix them. Here are some things you can try:  · Be sure the mask or nasal prongs fit well. · See if your doctor can adjust the pressure of your CPAP. · If your nose is dry, try a humidifier. If these things do not help, you might try a different type of machine. Some machines have air pressure that adjusts on its own. Others have air pressures that are different when you breathe in than when you breathe out. This may reduce discomfort caused by too much pressure in your nose. Where can you learn more? Go to https://Epiphany Incdebby.Aha Mobile. org and sign in to your mAPPn account. Enter Y951 in the ClearStream box to learn more about \"Learning About CPAP for Sleep Apnea. \"     If you do not have an account, please click on the \"Sign Up Now\" link.   Current as of: February 24, 2020               Content Version: 12.5  © 2006-2020 Healthwise, Incorporated. Care instructions adapted under license by [facility] ([facility]). If you have questions about a medical condition or this instruction, always ask your healthcare professional. [address] any warranty or liability for your use of this information.

## 2020-11-19 ENCOUNTER — OFFICE VISIT (OUTPATIENT)
Dept: FAMILY MEDICINE CLINIC | Age: 30
End: 2020-11-19
Payer: COMMERCIAL

## 2020-11-19 VITALS
HEART RATE: 93 BPM | WEIGHT: 309 LBS | RESPIRATION RATE: 16 BRPM | DIASTOLIC BLOOD PRESSURE: 84 MMHG | BODY MASS INDEX: 46.83 KG/M2 | OXYGEN SATURATION: 97 % | TEMPERATURE: 97.5 F | HEIGHT: 68 IN | SYSTOLIC BLOOD PRESSURE: 130 MMHG

## 2020-11-19 PROCEDURE — G8482 FLU IMMUNIZE ORDER/ADMIN: HCPCS | Performed by: FAMILY MEDICINE

## 2020-11-19 PROCEDURE — 99214 OFFICE O/P EST MOD 30 MIN: CPT | Performed by: FAMILY MEDICINE

## 2020-11-19 PROCEDURE — 4004F PT TOBACCO SCREEN RCVD TLK: CPT | Performed by: FAMILY MEDICINE

## 2020-11-19 PROCEDURE — G8427 DOCREV CUR MEDS BY ELIG CLIN: HCPCS | Performed by: FAMILY MEDICINE

## 2020-11-19 PROCEDURE — G8417 CALC BMI ABV UP PARAM F/U: HCPCS | Performed by: FAMILY MEDICINE

## 2020-11-19 PROCEDURE — 90471 IMMUNIZATION ADMIN: CPT | Performed by: FAMILY MEDICINE

## 2020-11-19 PROCEDURE — 90686 IIV4 VACC NO PRSV 0.5 ML IM: CPT | Performed by: FAMILY MEDICINE

## 2020-11-19 RX ORDER — DEXTROAMPHETAMINE SACCHARATE, AMPHETAMINE ASPARTATE MONOHYDRATE, DEXTROAMPHETAMINE SULFATE AND AMPHETAMINE SULFATE 7.5; 7.5; 7.5; 7.5 MG/1; MG/1; MG/1; MG/1
30 CAPSULE, EXTENDED RELEASE ORAL EVERY MORNING
Qty: 30 CAPSULE | Refills: 0 | Status: SHIPPED | OUTPATIENT
Start: 2020-12-19 | End: 2020-11-19 | Stop reason: SDUPTHER

## 2020-11-19 RX ORDER — VARENICLINE TARTRATE 1 MG/1
1 TABLET, FILM COATED ORAL 2 TIMES DAILY
Qty: 60 TABLET | Refills: 5 | Status: SHIPPED
Start: 2020-11-19 | End: 2021-02-22

## 2020-11-19 RX ORDER — QUETIAPINE FUMARATE 50 MG/1
50 TABLET, EXTENDED RELEASE ORAL NIGHTLY
Qty: 30 TABLET | Refills: 5 | Status: SHIPPED
Start: 2020-11-19 | End: 2021-02-22 | Stop reason: SDUPTHER

## 2020-11-19 RX ORDER — DEXTROAMPHETAMINE SACCHARATE, AMPHETAMINE ASPARTATE MONOHYDRATE, DEXTROAMPHETAMINE SULFATE AND AMPHETAMINE SULFATE 7.5; 7.5; 7.5; 7.5 MG/1; MG/1; MG/1; MG/1
30 CAPSULE, EXTENDED RELEASE ORAL EVERY MORNING
Qty: 30 CAPSULE | Refills: 0 | Status: SHIPPED | OUTPATIENT
Start: 2020-11-19 | End: 2020-11-19 | Stop reason: SDUPTHER

## 2020-11-19 RX ORDER — DEXTROAMPHETAMINE SACCHARATE, AMPHETAMINE ASPARTATE MONOHYDRATE, DEXTROAMPHETAMINE SULFATE AND AMPHETAMINE SULFATE 7.5; 7.5; 7.5; 7.5 MG/1; MG/1; MG/1; MG/1
30 CAPSULE, EXTENDED RELEASE ORAL EVERY MORNING
Qty: 30 CAPSULE | Refills: 0 | Status: SHIPPED | OUTPATIENT
Start: 2021-01-19 | End: 2021-02-22 | Stop reason: SDUPTHER

## 2020-11-19 RX ORDER — PANTOPRAZOLE SODIUM 20 MG/1
20 TABLET, DELAYED RELEASE ORAL
Qty: 30 TABLET | Refills: 2 | Status: CANCELLED | OUTPATIENT
Start: 2020-11-19

## 2020-11-19 RX ORDER — VENLAFAXINE HYDROCHLORIDE 150 MG/1
150 CAPSULE, EXTENDED RELEASE ORAL DAILY
Qty: 30 CAPSULE | Refills: 5 | Status: SHIPPED
Start: 2020-11-19 | End: 2021-02-22 | Stop reason: SDUPTHER

## 2020-11-19 ASSESSMENT — ENCOUNTER SYMPTOMS
COUGH: 0
SHORTNESS OF BREATH: 0
VOMITING: 0
CONSTIPATION: 0
NAUSEA: 0
DIARRHEA: 0

## 2020-11-19 NOTE — PROGRESS NOTES
Vaccine Information Sheet, \"Influenza - Inactivated\"  given to Chris Luque, or parent/legal guardian of  Chris Luque and verbalized understanding. Patient responses:    Have you ever had a reaction to a flu vaccine? No  Do you have any current illness? No  Have you ever had [place] Syndrome? No  Do you have a serious allergy to any of the follow: Neomycin, Polymyxin, Thimerosal, eggs or egg products? No    Flu vaccine given per order. Please see immunization tab. Risks and benefits explained. Current VIS given.       Immunizations Administered     Name Date Dose Route    Influenza, Quadv, IM, PF (6 mo and older Fluzone, Flulaval, Fluarix, and 3 yrs and older Afluria) 11/19/2020 0.5 mL Intramuscular    Site: Deltoid- Left    Lot: [id number]    NDC: 69326-334-94

## 2020-11-19 NOTE — PROGRESS NOTES
2020     Nathan Uriarte (:  1990) is a 30 y.o. male, with a:  Past Medical History:   Diagnosis Date    ADHD (attention deficit hyperactivity disorder)     Class 3 severe obesity due to excess calories without serious comorbidity with body mass index (BMI) of 40.0 to 44.9 in adult [facility]) 2019    Depression     Sleep apnea     Tobacco abuse 4/3/2019       Here for evaluation of the following medical concerns:  Chief Complaint   Patient presents with    ADHD    Depression    Gastroesophageal Reflux    Other     requests Tdap D/T new baby coming in december      F/U of chronic problem(s) and recent complaint of upper abdominal pain  Chronic problems reviewed today include:  ADHD, depression and mood disorder,  tobacco abuse  Current status of this/these condition(s):  stable  Tolerating meds:         Yes    ADD/ADHD   Current treatment: Adderall XR, which has been effective. Recent medication changes: None  Medication side effects: none.      Depression / Mood Disorder  Current treatment: Venlafaxine 150 milligrams daily and Seroquel XR 50 mg nightly  Recent medication changes: venlafaxine increased  Onset was approximately several months ago. Symptoms have been stable since that time. Risk factors: negative life event recent unsuccessful move to [place]. Previous treatment includes individual therapy and medication. Symptoms are stable    Tobacco abuse    Current treatment: Chantix  Recent medication changes: He was unable tolerate Wellbutrin  Still smoking, but continuing to cut back    Epigastric abdominal pain  Current treatment: pantoprazole PRN  Recent medication changes: pantoprazole started  Symptoms have improved    Review of Systems   Constitutional: Negative for chills and fever. Respiratory: Negative for cough and shortness of breath. Cardiovascular: Negative for chest pain and leg swelling.    Gastrointestinal: Negative for abdominal pain, constipation, diarrhea, nausea and vomiting. Genitourinary: Negative for dysuria. Neurological: Negative for headaches. Psychiatric/Behavioral: Positive for agitation (improving), decreased concentration (improving) and dysphoric mood (improving). The patient is not nervous/anxious. Prior to Visit Medications    Medication Sig Taking? Authorizing Provider   acetaminophen (TYLENOL) 325 MG tablet Take 650 mg by mouth every 6 hours as needed for Pain Yes Historical Provider, MD   QUEtiapine (SEROQUEL XR) 50 MG extended release tablet Take 1 tablet by mouth nightly Yes Riaz Pierce DO   varenicline (CHANTIX) 1 MG tablet Take 1 tablet by mouth 2 times daily Yes Riaz Pierce DO   venlafaxine (EFFEXOR XR) 150 MG extended release capsule Take 1 capsule by mouth daily Yes Riaz Pierce DO   amphetamine-dextroamphetamine (ADDERALL XR) 30 MG extended release capsule Take 1 capsule by mouth every morning for 30 days. Yes Riaz Pierce DO   pantoprazole (PROTONIX) 20 MG tablet Take 1 tablet by mouth every morning (before breakfast) Yes Riaz Pierce DO   Multiple Vitamins-Minerals (THERAPEUTIC MULTIVITAMIN-MINERALS) tablet Take 1 tablet by mouth daily Yes Historical Provider, MD        Social History     Tobacco Use    Smoking status: Current Some Day Smoker     Packs/day: 1.00     Years: 10.00     Pack years: 10.00    Smokeless tobacco: Never Used   Substance Use Topics    Alcohol use: Yes     Comment: social        Past Surgical History:   Procedure Laterality Date    FOOT SURGERY Left 2009       Vitals:    11/19/20 1435   BP: 130/84   Pulse: 93   Resp: 16   Temp: 97.5 °F (36.4 °C)   TempSrc: Temporal   SpO2: 97%   Weight: (!) 309 lb (140.2 kg)   Height: 5' 8\" (1.727 m)     Estimated body mass index is 46.98 kg/m² as calculated from the following:    Height as of this encounter: 5' 8\" (1.727 m). Weight as of this encounter: 309 lb (140.2 kg).     Physical Exam  Constitutional:       General: He is not in acute distress. Appearance: He is well-developed. He is obese. HENT:      Head: Normocephalic and atraumatic. Right Ear: External ear normal.      Left Ear: External ear normal.      Nose: Nose normal. No congestion or rhinorrhea. Eyes:      Extraocular Movements: Extraocular movements intact. Pupils: Pupils are equal, round, and reactive to light. Neck:      Musculoskeletal: Normal range of motion. Thyroid: No thyromegaly. Cardiovascular:      Rate and Rhythm: Normal rate and regular rhythm. Pulmonary:      Effort: Pulmonary effort is normal. No respiratory distress. Breath sounds: Normal breath sounds. No wheezing or rales. Abdominal:      General: There is no distension. Palpations: Abdomen is soft. Tenderness: There is no abdominal tenderness. Musculoskeletal:         General: No swelling or deformity. Skin:     General: Skin is warm. Findings: No rash. Neurological:      General: No focal deficit present. Mental Status: He is alert. Mental status is at baseline. Psychiatric:         Mood and Affect: Mood normal.         Behavior: Behavior normal.         ASSESSMENT/PLAN:  Fitz Shelby was seen today for adhd, depression, gastroesophageal reflux and other. Diagnoses and all orders for this visit:    Attention deficit hyperactivity disorder (ADHD), unspecified ADHD type  -     Discontinue: amphetamine-dextroamphetamine (ADDERALL XR) 30 MG extended release capsule; Take 1 capsule by mouth every morning for 30 days. -     CBC Auto Differential; Future  -     Comprehensive Metabolic Panel; Future  -     Discontinue: amphetamine-dextroamphetamine (ADDERALL XR) 30 MG extended release capsule; Take 1 capsule by mouth every morning for 30 days. -     amphetamine-dextroamphetamine (ADDERALL XR) 30 MG extended release capsule; Take 1 capsule by mouth every morning for 30 days.     Mood disorder (HCC)  -     QUEtiapine (SEROQUEL XR) 50 MG extended

## 2020-11-20 ASSESSMENT — ENCOUNTER SYMPTOMS: ABDOMINAL PAIN: 0

## 2020-12-09 ENCOUNTER — OFFICE VISIT (OUTPATIENT)
Dept: BARIATRICS/WEIGHT MGMT | Age: 30
End: 2020-12-09
Payer: COMMERCIAL

## 2020-12-09 VITALS
SYSTOLIC BLOOD PRESSURE: 137 MMHG | BODY MASS INDEX: 47.84 KG/M2 | HEIGHT: 67 IN | HEART RATE: 79 BPM | WEIGHT: 304.8 LBS | DIASTOLIC BLOOD PRESSURE: 87 MMHG | TEMPERATURE: 97.1 F

## 2020-12-09 PROCEDURE — G8482 FLU IMMUNIZE ORDER/ADMIN: HCPCS | Performed by: INTERNAL MEDICINE

## 2020-12-09 PROCEDURE — 4004F PT TOBACCO SCREEN RCVD TLK: CPT | Performed by: INTERNAL MEDICINE

## 2020-12-09 PROCEDURE — G8428 CUR MEDS NOT DOCUMENT: HCPCS | Performed by: INTERNAL MEDICINE

## 2020-12-09 PROCEDURE — G8417 CALC BMI ABV UP PARAM F/U: HCPCS | Performed by: INTERNAL MEDICINE

## 2020-12-09 PROCEDURE — 99205 OFFICE O/P NEW HI 60 MIN: CPT | Performed by: INTERNAL MEDICINE

## 2020-12-09 PROCEDURE — 99201 HC NEW PT, E/M LEVEL 1: CPT

## 2020-12-09 NOTE — PATIENT INSTRUCTIONS
Establish 8 hours of food-free periods each day: 2 hours before bed time + 6 hours throughout the day, no period less than one hour and they must be the same every day. Limit sweets to one day per month  Limit restaurant food to once every two weeks  Limit chips/nuts/crackers/pretzels to 150 gt/day  Stop all Sugar Sweetened Beverages  Limit milk to 8 oz of 2%/day  Stop all sugar and flavored creamer in coffee  See me in one month.

## 2020-12-09 NOTE — PROGRESS NOTES
CC -   BLADE, Obesity    Background -   First visit: 12/09/20 (today)    · BLADE  Diagnosed 2016  Moderate in severity  Using a CPAP  Daytime sleepiness: 4-9/10 (greatest when activity is low)  Daytime fatigue: 7-8/10  Restorative: 1/10    · Obesity  Began in childhood  Initial BMI 48.5, Wt 304.8 lbs  HS Grad wt 230 lbs  Lowest   wt 230 lbs  Highest  wt 310 lbs  Pattern of wt gain: grad until the past year  Wt change past yr: 30 lbs  Most wt lost: 10 lbs (stopped soda)  Other diets attempted: none    Desire to lose weight: 10/10  Problem posed by appetite: 6-7/10     Initial Diet:    Number of meals per day - 2-3    Number of snacks per day - 4-5    Meal volume - 12\" plate, sometimes seconds    Fast food/convenience store - 2-4x/week    Restaurants (not fast food) - 0-1x/week   Sweets - 7d/week (Keebler Fudge Stripe Cookies 8-10, lots of other sweet snacks)   Chips - 5-7d/week (Large portion of a large bag)   Crackers/pretzels - 0-1d/week   Nuts - 1-3d/week (5-7 handfuls)   Peanut Butter - 0-3d/week   Popcorn - 0d/week   Dried fruit - 0d/week   Whole fruit - 0d/week   Breakfast cereal - 0-2d/week (Cinnamon Toast Crunch)   Granola/Protein/Energy bar - 0d/week   Sugar sweetened beverages - 60-80 oz reg soda/wk, 64oz 2% milk/d, 4-7 cups coffee/wk, each with 3-4 tablespoons of sugar with 3-6 oz of flavored creamer in each    Protein - No supplements   Fiber - No supplements     Exercise:    Gym membership - Private gym in [place], but does not go    Walking - None    Running - None    Resistance - None    Aerobic class - None      ______________________    [facility] -  Past Medical History:   Diagnosis Date    ADHD (attention deficit hyperactivity disorder)     Class 3 severe obesity due to excess calories without serious comorbidity with body mass index (BMI) of 40.0 to 44.9 in adult [facility]) 4/30/2019    Depression     Sleep apnea     Tobacco abuse 4/3/2019     Current Outpatient Medications   Medication Sig Dispense Refill However, even if they are half of what he states, they still are one-third of his calorie needs. Therefore, eliminating them may produce a sufficient rate of weight loss. Will do this before implementing other interventions. Had success with using shakes as meal substitutes before, so may try this if elimination alone fails. Assessment  - Uncontrolled  Plan   -   Patient Instructions   Establish 8 hours of food-free periods each day: 2 hours before bed time + 6 hours throughout the day, no period less than one hour and they must be the same every day. Limit sweets to one day per month  Limit restaurant food to once every two weeks  Limit chips/nuts/crackers/pretzels to 150 gt/day  Stop all Sugar Sweetened Beverages  Limit milk to 8 oz of 2%/day  Stop all sugar and flavored creamer in coffee    Follow up  Return to see me in one month    I spent 60 minutes in a face to face encounter with Demetria Bates today.    >40 minutes of this was in education and counseling regarding dietary interventions for weight reduction, daily energy needs, weight impact of trouble foods, elimination vs moderation and food-free periods    Christal Alejandro MD  Endocrinology/Obesity  12/9/20

## 2021-02-03 ENCOUNTER — TELEPHONE (OUTPATIENT)
Dept: ADMINISTRATIVE | Age: 31
End: 2021-02-03

## 2021-02-11 ENCOUNTER — HOSPITAL ENCOUNTER (OUTPATIENT)
Dept: GENERAL RADIOLOGY | Age: 31
Discharge: HOME | End: 2021-02-13
Payer: COMMERCIAL

## 2021-02-11 ENCOUNTER — HOSPITAL ENCOUNTER (OUTPATIENT)
Age: 31
Discharge: HOME | End: 2021-02-13
Payer: COMMERCIAL

## 2021-02-11 DIAGNOSIS — M99.01 CERVICAL (NECK) REGION SOMATIC DYSFUNCTION: ICD-10-CM

## 2021-02-11 DIAGNOSIS — M99.02 SEGMENTAL AND SOMATIC DYSFUNCTION OF THORACIC REGION: ICD-10-CM

## 2021-02-11 PROCEDURE — 72072 X-RAY EXAM THORAC SPINE 3VWS: CPT

## 2021-02-11 PROCEDURE — 72050 X-RAY EXAM NECK SPINE 4/5VWS: CPT

## 2021-02-15 ENCOUNTER — OFFICE VISIT (OUTPATIENT)
Dept: BARIATRICS/WEIGHT MGMT | Age: 31
End: 2021-02-15
Payer: COMMERCIAL

## 2021-02-15 VITALS
HEIGHT: 67 IN | HEART RATE: 89 BPM | TEMPERATURE: 97.5 F | WEIGHT: 300 LBS | DIASTOLIC BLOOD PRESSURE: 82 MMHG | BODY MASS INDEX: 47.09 KG/M2 | SYSTOLIC BLOOD PRESSURE: 136 MMHG

## 2021-02-15 DIAGNOSIS — G47.33 OBSTRUCTIVE SLEEP APNEA SYNDROME: Primary | ICD-10-CM

## 2021-02-15 DIAGNOSIS — E66.01 CLASS 3 SEVERE OBESITY DUE TO EXCESS CALORIES WITHOUT SERIOUS COMORBIDITY WITH BODY MASS INDEX (BMI) OF 40.0 TO 44.9 IN ADULT (HCC): ICD-10-CM

## 2021-02-15 PROCEDURE — 99213 OFFICE O/P EST LOW 20 MIN: CPT | Performed by: INTERNAL MEDICINE

## 2021-02-15 PROCEDURE — 4004F PT TOBACCO SCREEN RCVD TLK: CPT | Performed by: INTERNAL MEDICINE

## 2021-02-15 PROCEDURE — G8417 CALC BMI ABV UP PARAM F/U: HCPCS | Performed by: INTERNAL MEDICINE

## 2021-02-15 PROCEDURE — G8482 FLU IMMUNIZE ORDER/ADMIN: HCPCS | Performed by: INTERNAL MEDICINE

## 2021-02-15 PROCEDURE — G8428 CUR MEDS NOT DOCUMENT: HCPCS | Performed by: INTERNAL MEDICINE

## 2021-02-15 PROCEDURE — 99211 OFF/OP EST MAY X REQ PHY/QHP: CPT

## 2021-02-15 NOTE — PROGRESS NOTES
Dispense Refill    QUEtiapine (SEROQUEL XR) 50 MG extended release tablet Take 1 tablet by mouth nightly 30 tablet 5    varenicline (CHANTIX) 1 MG tablet Take 1 tablet by mouth 2 times daily 60 tablet 5    venlafaxine (EFFEXOR XR) 150 MG extended release capsule Take 1 capsule by mouth daily 30 capsule 5    amphetamine-dextroamphetamine (ADDERALL XR) 30 MG extended release capsule Take 1 capsule by mouth every morning for 30 days. 30 capsule 0    acetaminophen (TYLENOL) 325 MG tablet Take 650 mg by mouth every 6 hours as needed for Pain      Multiple Vitamins-Minerals (THERAPEUTIC MULTIVITAMIN-MINERALS) tablet Take 1 tablet by mouth daily       No current facility-administered medications for this visit. ROS -  Gen - no fever  Pulm - no cough    PE -  Gen : /82 (Site: Left Upper Arm, Position: Sitting, Cuff Size: Large Adult)   Pulse 89   Temp 97.5 °F (36.4 °C) (Temporal)   Ht 5' 6.5\" (1.689 m)   Wt 300 lb (136.1 kg)   BMI 47.70 kg/m²    WN, WD, NAD  Lung: Nml resp effort  Psych: Normal mood   Full affect  Neuro:  Moves all ext well  ______________________      HPI & A/P -   Problem 1  - BLADE  HPI   - See above Background for description      Compliant with CPAP      Daytime sleepiness: 4-9/10       Excess weight is worsening his underlying airway obstruction  Assessment  - Uncontrolled  Plan   - Cont with nightly CPAP      Proceed with wt reduction per the plan below    Problem 2  - Obesity   HPI   - See above Background for description      Weight Date      304.8 lbs 12/09/20      300.0 lbs 02/15/21      Total weight loss to date: 4.8 lbs    DEN = 2650 gt/day = 18,550 gt/wk    Avg daily energy deficit = 16,800/67d = 250 gt/day    Weight impact of trouble foods =  Restaurant food 450 gt/wk + Sweets 4200 gt/wk + Chips 2400 gt/wk + Nuts 1800 gt/wk + Soda 840 gt/wk + Milk 6160 gt/wk + Coffee 1500 gt/wk = 17,350 gt/wk = 2,480 gt/day = 248 lbs/yr     His report of his trouble foods are an over-estimation     However, even if they are half of what he states, they still are one-third of his calorie needs. Therefore, eliminating them may produce a sufficient rate of weight loss. Will do this before implementing other interventions. Had success with using shakes as meal substitutes before, so may try this if elimination alone fails. Drinking reg soda 12 oz/wk    Eating sweets 2d/wk    Exceeding 150 gt/d 2-3d/wk for chips/crackers/pretzels/nuts    Restaurant food 4x/wk    Milk 12-16 oz/d (2% or skim)    Not drinking any sugar or creamer in his coffee  Assessment  - Improved, he cannot afford a weight loss drug and is hesitant about the interaction with his other medications; does not want to count calories, but is willing to become calorie conscious  Plan   -   Patient Instructions   For 1 lb/5 days wt loss, calorie intake must be below 1800 gt/day  Walk 30 min/day  Establish 8 hours of food-free periods each day: 2 hours before bed time + 6 hours throughout the day, no period less than one hour and they must be the same every day.   Limit sweets to one day per month  Limit restaurant food to once every two weeks  Limit chips/nuts/crackers/pretzels to 150 gt/day  Stop all Sugar Sweetened Beverages  Limit milk to 8 oz of 2%/day  Stop all sugar and flavored creamer in coffee  See me in six weeks      Kera Hubbard MD  Endocrinology/Obesity  2/15/21 with patient

## 2021-02-15 NOTE — PATIENT INSTRUCTIONS
For 1 lb/5 days wt loss, calorie intake must be below 1800 gt/day  Walk 30 min/day  Establish 8 hours of food-free periods each day: 2 hours before bed time + 6 hours throughout the day, no period less than one hour and they must be the same every day.   Limit sweets to one day per month  Limit restaurant food to once every two weeks  Limit chips/nuts/crackers/pretzels to 150 gt/day  Stop all Sugar Sweetened Beverages  Limit milk to 8 oz of 2%/day  Stop all sugar and flavored creamer in coffee  See me in six weeks

## 2021-02-22 ENCOUNTER — OFFICE VISIT (OUTPATIENT)
Dept: FAMILY MEDICINE CLINIC | Age: 31
End: 2021-02-22
Payer: COMMERCIAL

## 2021-02-22 VITALS
OXYGEN SATURATION: 98 % | WEIGHT: 302 LBS | RESPIRATION RATE: 16 BRPM | HEIGHT: 68 IN | SYSTOLIC BLOOD PRESSURE: 130 MMHG | HEART RATE: 90 BPM | BODY MASS INDEX: 45.77 KG/M2 | TEMPERATURE: 97.5 F | DIASTOLIC BLOOD PRESSURE: 80 MMHG

## 2021-02-22 DIAGNOSIS — F41.9 ANXIETY: ICD-10-CM

## 2021-02-22 DIAGNOSIS — F32.A MILD DEPRESSION: ICD-10-CM

## 2021-02-22 DIAGNOSIS — F39 MOOD DISORDER (HCC): ICD-10-CM

## 2021-02-22 DIAGNOSIS — F90.9 ATTENTION DEFICIT HYPERACTIVITY DISORDER (ADHD), UNSPECIFIED ADHD TYPE: Primary | ICD-10-CM

## 2021-02-22 DIAGNOSIS — G47.00 INSOMNIA, UNSPECIFIED TYPE: ICD-10-CM

## 2021-02-22 PROCEDURE — G8417 CALC BMI ABV UP PARAM F/U: HCPCS | Performed by: FAMILY MEDICINE

## 2021-02-22 PROCEDURE — G8427 DOCREV CUR MEDS BY ELIG CLIN: HCPCS | Performed by: FAMILY MEDICINE

## 2021-02-22 PROCEDURE — G8482 FLU IMMUNIZE ORDER/ADMIN: HCPCS | Performed by: FAMILY MEDICINE

## 2021-02-22 PROCEDURE — 99214 OFFICE O/P EST MOD 30 MIN: CPT | Performed by: FAMILY MEDICINE

## 2021-02-22 PROCEDURE — 4004F PT TOBACCO SCREEN RCVD TLK: CPT | Performed by: FAMILY MEDICINE

## 2021-02-22 RX ORDER — VENLAFAXINE HYDROCHLORIDE 150 MG/1
150 CAPSULE, EXTENDED RELEASE ORAL DAILY
Qty: 30 CAPSULE | Refills: 5 | Status: SHIPPED
Start: 2021-02-22 | End: 2021-08-30 | Stop reason: SDUPTHER

## 2021-02-22 RX ORDER — QUETIAPINE FUMARATE 50 MG/1
50 TABLET, EXTENDED RELEASE ORAL NIGHTLY
Qty: 30 TABLET | Refills: 5 | Status: SHIPPED
Start: 2021-02-22 | End: 2021-08-30 | Stop reason: SDUPTHER

## 2021-02-22 RX ORDER — DEXTROAMPHETAMINE SACCHARATE, AMPHETAMINE ASPARTATE MONOHYDRATE, DEXTROAMPHETAMINE SULFATE AND AMPHETAMINE SULFATE 7.5; 7.5; 7.5; 7.5 MG/1; MG/1; MG/1; MG/1
30 CAPSULE, EXTENDED RELEASE ORAL EVERY MORNING
Qty: 30 CAPSULE | Refills: 0 | Status: SHIPPED | OUTPATIENT
Start: 2021-04-22 | End: 2021-05-27 | Stop reason: SDUPTHER

## 2021-02-22 RX ORDER — VENLAFAXINE HYDROCHLORIDE 75 MG/1
CAPSULE, EXTENDED RELEASE ORAL
Qty: 30 CAPSULE | Refills: 5 | Status: SHIPPED
Start: 2021-02-22 | End: 2021-05-27 | Stop reason: SDUPTHER

## 2021-02-22 RX ORDER — DEXTROAMPHETAMINE SACCHARATE, AMPHETAMINE ASPARTATE MONOHYDRATE, DEXTROAMPHETAMINE SULFATE AND AMPHETAMINE SULFATE 7.5; 7.5; 7.5; 7.5 MG/1; MG/1; MG/1; MG/1
30 CAPSULE, EXTENDED RELEASE ORAL EVERY MORNING
Qty: 30 CAPSULE | Refills: 0 | Status: SHIPPED | OUTPATIENT
Start: 2021-02-22 | End: 2021-02-22 | Stop reason: SDUPTHER

## 2021-02-22 RX ORDER — DEXTROAMPHETAMINE SACCHARATE, AMPHETAMINE ASPARTATE MONOHYDRATE, DEXTROAMPHETAMINE SULFATE AND AMPHETAMINE SULFATE 7.5; 7.5; 7.5; 7.5 MG/1; MG/1; MG/1; MG/1
30 CAPSULE, EXTENDED RELEASE ORAL EVERY MORNING
Qty: 30 CAPSULE | Refills: 0 | Status: SHIPPED | OUTPATIENT
Start: 2021-03-22 | End: 2021-02-22 | Stop reason: SDUPTHER

## 2021-02-22 ASSESSMENT — ENCOUNTER SYMPTOMS
CONSTIPATION: 0
COUGH: 0
VOMITING: 0
ABDOMINAL PAIN: 0
NAUSEA: 0
DIARRHEA: 0
SHORTNESS OF BREATH: 0

## 2021-02-22 NOTE — PROGRESS NOTES
2021     Malika Garsia (:  1990) is a 26 y.o. male, with a:  Past Medical History:   Diagnosis Date    ADHD (attention deficit hyperactivity disorder)     Class 3 severe obesity due to excess calories without serious comorbidity with body mass index (BMI) of 40.0 to 44.9 in adult [facility]) 2019    Depression     Sleep apnea     Tobacco abuse 4/3/2019       Here for evaluation of the following medical concerns:  Chief Complaint   Patient presents with    ADHD     Patient stated he forgot about labs but will complete them this week     Anxiety    Depression     Interval Hx  - established with endocrinology    F/U of chronic problem(s)   Chronic problems reviewed today include:  ADHD, depression and mood disorder,  tobacco abuse  Current status of this/these condition(s):  stable  Tolerating meds:         Yes    ADD/ADHD   Current treatment: Adderall XR, which has been effective. Recent medication changes: None  Medication side effects: none.      Depression / Anxiety / Mood Disorder  Current treatment: Venlafaxine 150 milligrams daily and Seroquel XR 50 mg nightly  Recent medication changes: venlafaxine increased  Onset was approximately several months ago. Symptoms have been stable since that time. Risk factors: negative life event recent unsuccessful move to [place]. Previous treatment includes individual therapy and medication. Symptoms are stable, feels like anxiety is worsening    Review of Systems   Constitutional: Negative for chills and fever. Respiratory: Negative for cough and shortness of breath. Cardiovascular: Negative for chest pain and leg swelling. Gastrointestinal: Negative for abdominal pain, constipation, diarrhea, nausea and vomiting. Genitourinary: Negative for dysuria. Neurological: Negative for headaches. Psychiatric/Behavioral: Positive for agitation (improving), decreased concentration (improving) and dysphoric mood (improving).  The Normal range of motion. Thyroid: No thyromegaly. Cardiovascular:      Rate and Rhythm: Normal rate and regular rhythm. Pulmonary:      Effort: Pulmonary effort is normal. No respiratory distress. Breath sounds: Normal breath sounds. No wheezing or rales. Abdominal:      General: There is no distension. Palpations: Abdomen is soft. Tenderness: There is no abdominal tenderness. Musculoskeletal:         General: No swelling or deformity. Skin:     General: Skin is warm. Findings: No rash. Neurological:      General: No focal deficit present. Mental Status: He is alert. Mental status is at baseline. Psychiatric:         Mood and Affect: Mood normal.         Behavior: Behavior normal.         ASSESSMENT/PLAN:  Jaja Ferrari was seen today for adhd, anxiety and depression. Diagnoses and all orders for this visit:    Attention deficit hyperactivity disorder (ADHD), unspecified ADHD type  -     Discontinue: amphetamine-dextroamphetamine (ADDERALL XR) 30 MG extended release capsule; Take 1 capsule by mouth every morning for 30 days. -     Discontinue: amphetamine-dextroamphetamine (ADDERALL XR) 30 MG extended release capsule; Take 1 capsule by mouth every morning for 30 days. -     amphetamine-dextroamphetamine (ADDERALL XR) 30 MG extended release capsule; Take 1 capsule by mouth every morning for 30 days. -     Amb External Referral To Psychiatry    [facility]  -     QUEtiapine (SEROQUEL XR) 50 MG extended release tablet; Take 1 tablet by mouth nightly  -     Amb External Referral To Psychiatry    Mild depression (HCC)  -     venlafaxine (EFFEXOR XR) 150 MG extended release capsule; Take 1 capsule by mouth daily  -     Amb External Referral To Psychiatry  -     venlafaxine (EFFEXOR XR) 75 MG extended release capsule;  Take 225 mg daily    Insomnia, unspecified type  -     Amb External Referral To Psychiatry    Anxiety  -     Amb External Referral To Psychiatry  - venlafaxine (EFFEXOR XR) 75 MG extended release capsule; Take 225 mg daily       Additional plan and future considerations:   As above. Increase venlafaxine to 225 mg daily and refer to Psychiatry. Continue follow up with sleep medicine and bariatrics as instructed. RTO in 3 months or sooner if needed. Controlled substances monitoring: [place] report reviewed today- activity consistent with treatment plan.       Future Appointments   Date Time Provider Tanika Link   3/29/2021  9:30 AM Jcarlos Cortés MD Surg Weight [facility]   4/6/2021  9:40 AM Aurea Nguyen DO [facility] SLEEP [facility]   5/27/2021  9:30 AM Adolfo Yap DO [facility]         --Adolfo Yap DO on 2/22/2021 at 1:16 PM

## 2021-03-02 DIAGNOSIS — R10.13 EPIGASTRIC ABDOMINAL PAIN: ICD-10-CM

## 2021-03-02 RX ORDER — PANTOPRAZOLE SODIUM 20 MG/1
20 TABLET, DELAYED RELEASE ORAL
Qty: 30 TABLET | Refills: 2 | Status: SHIPPED
Start: 2021-03-02 | End: 2021-05-27 | Stop reason: SDUPTHER

## 2021-03-28 ENCOUNTER — IMMUNIZATION (OUTPATIENT)
Dept: PRIMARY CARE CLINIC | Age: 31
End: 2021-03-28
Payer: COMMERCIAL

## 2021-03-28 PROCEDURE — 0001A COVID-19, PFIZER VACCINE 30MCG/0.3ML DOSE: CPT | Performed by: PHYSICIAN ASSISTANT

## 2021-03-28 PROCEDURE — 91300 COVID-19, PFIZER VACCINE 30MCG/0.3ML DOSE: CPT | Performed by: PHYSICIAN ASSISTANT

## 2021-03-29 ENCOUNTER — OFFICE VISIT (OUTPATIENT)
Dept: BARIATRICS/WEIGHT MGMT | Age: 31
End: 2021-03-29
Payer: COMMERCIAL

## 2021-03-29 VITALS
WEIGHT: 290.8 LBS | BODY MASS INDEX: 45.64 KG/M2 | TEMPERATURE: 97.2 F | HEIGHT: 67 IN | DIASTOLIC BLOOD PRESSURE: 80 MMHG | HEART RATE: 82 BPM | SYSTOLIC BLOOD PRESSURE: 139 MMHG

## 2021-03-29 DIAGNOSIS — E66.01 CLASS 3 SEVERE OBESITY DUE TO EXCESS CALORIES WITHOUT SERIOUS COMORBIDITY WITH BODY MASS INDEX (BMI) OF 40.0 TO 44.9 IN ADULT (HCC): ICD-10-CM

## 2021-03-29 DIAGNOSIS — G47.33 OBSTRUCTIVE SLEEP APNEA SYNDROME: Primary | ICD-10-CM

## 2021-03-29 PROCEDURE — G8482 FLU IMMUNIZE ORDER/ADMIN: HCPCS | Performed by: INTERNAL MEDICINE

## 2021-03-29 PROCEDURE — G8428 CUR MEDS NOT DOCUMENT: HCPCS | Performed by: INTERNAL MEDICINE

## 2021-03-29 PROCEDURE — 4004F PT TOBACCO SCREEN RCVD TLK: CPT | Performed by: INTERNAL MEDICINE

## 2021-03-29 PROCEDURE — 99213 OFFICE O/P EST LOW 20 MIN: CPT | Performed by: INTERNAL MEDICINE

## 2021-03-29 PROCEDURE — 99211 OFF/OP EST MAY X REQ PHY/QHP: CPT

## 2021-03-29 PROCEDURE — G8417 CALC BMI ABV UP PARAM F/U: HCPCS | Performed by: INTERNAL MEDICINE

## 2021-03-29 NOTE — PROGRESS NOTES
CC -   BLADE, Obesity    Background -   Last visit:   2/15/21  First visit: 12/09/20     · BLADE  Diagnosed 2016  Moderate in severity  Using a CPAP  Daytime sleepiness: 4-9/10 (greatest when activity is low)  Daytime fatigue: 7-8/10  Restorative: 1/10    · Obesity  Began in childhood  Initial BMI 48.5, Wt 304.8 lbs  HS Grad wt 230 lbs  Lowest   wt 230 lbs  Highest  wt 310 lbs  Pattern of wt gain: grad until the past year  Wt change past yr: 30 lbs  Most wt lost: 10 lbs (stopped soda)  Other diets attempted: none    Desire to lose weight: 10/10  Problem posed by appetite: 6-7/10     Initial Diet:    Number of meals per day - 2-3    Number of snacks per day - 4-5    Meal volume - 12\" plate, sometimes seconds    Fast food/convenience store - 2-4x/week    Restaurants (not fast food) - 0-1x/week   Sweets - 7d/week (Keebler Fudge Stripe Cookies 8-10, lots of other sweet snacks)   Chips - 5-7d/week (Large portion of a large bag)   Crackers/pretzels - 0-1d/week   Nuts - 1-3d/week (5-7 handfuls)   Peanut Butter - 0-3d/week   Popcorn - 0d/week   Dried fruit - 0d/week   Whole fruit - 0d/week   Breakfast cereal - 0-2d/week (Cinnamon Toast Crunch)   Granola/Protein/Energy bar - 0d/week   Sugar sweetened beverages - 60-80 oz reg soda/wk, 64oz 2% milk/d, 4-7 cups coffee/wk, each with 3-4 tablespoons of sugar with 3-6 oz of flavored creamer in each    Protein - No supplements   Fiber - No supplements     Exercise:    Gym membership - Private gym in [place], but does not go    Walking - None    Running - None    Resistance - None    Aerobic class - None    ______________________    [facility] -  Past Medical History:   Diagnosis Date    ADHD (attention deficit hyperactivity disorder)     Class 3 severe obesity due to excess calories without serious comorbidity with body mass index (BMI) of 40.0 to 44.9 in adult [facility]) 4/30/2019    Depression     Sleep apnea     Tobacco abuse 4/3/2019     Current Outpatient Medications   Medication Sig Dispense Refill    pantoprazole (PROTONIX) 20 MG tablet Take 1 tablet by mouth every morning (before breakfast) 30 tablet 2    QUEtiapine (SEROQUEL XR) 50 MG extended release tablet Take 1 tablet by mouth nightly 30 tablet 5    venlafaxine (EFFEXOR XR) 150 MG extended release capsule Take 1 capsule by mouth daily 30 capsule 5    [START ON 4/22/2021] amphetamine-dextroamphetamine (ADDERALL XR) 30 MG extended release capsule Take 1 capsule by mouth every morning for 30 days. 30 capsule 0    venlafaxine (EFFEXOR XR) 75 MG extended release capsule Take 225 mg daily 30 capsule 5    acetaminophen (TYLENOL) 325 MG tablet Take 650 mg by mouth every 6 hours as needed for Pain      Multiple Vitamins-Minerals (THERAPEUTIC MULTIVITAMIN-MINERALS) tablet Take 1 tablet by mouth daily       No current facility-administered medications for this visit. ROS -  Gen - no fever  Pulm - no cough    PE -  Gen : /80 (Site: Left Upper Arm, Position: Sitting, Cuff Size: Large Adult)   Pulse 82   Temp 97.2 °F (36.2 °C) (Temporal)   Ht 5' 6.5\" (1.689 m)   Wt 290 lb 12.8 oz (131.9 kg)   BMI 46.23 kg/m²    WN, WD, NAD  Lung: Nml resp effort  Psych: Normal mood   Full affect  Neuro:  Moves all ext well  ______________________      HPI & A/P -   Problem 1  - BLADE  HPI   - See above Background for description      Compliant with CPAP      Daytime sleepiness: 4-9/10       Excess weight is worsening his underlying airway obstruction  Assessment  - Uncontrolled  Plan   - Cont with nightly CPAP      Proceed with wt reduction per the plan below    Problem 2  - Obesity   HPI   - See above Background for description      Weight Date      304.8 lbs 12/09/20      300.0 lbs 02/15/21      290.8 lbs 03/29/21       Total weight loss to date: 14.0 lbs    DEN = 2650 gt/day = 18,550 gt/wk    Avg daily energy deficit:       12/09/20-02/15/21 = 16,800/67d = 250 gt/day       02/15/21-03/29/21 = 9.2 lbs/42d = 32,200cal/06p=718 gt/d Weight impact of trouble foods =  Restaurant food 450 gt/wk + Sweets 4200 gt/wk + Chips 2400 gt/wk + Nuts 1800 gt/wk + Soda 840 gt/wk + Milk 6160 gt/wk + Coffee 1500 gt/wk = 17,350 gt/wk = 2,480 gt/day = 248 lbs/yr     His report of his trouble foods were an over-estimation     However, even if they are half of what he states, they still are one-third of his calorie needs. Therefore, eliminating them may produce a sufficient rate of weight loss. Will do this before implementing other interventions. Had success with using shakes as meal substitutes before, so may try this if elimination alone fails. We did not go with a counting-based regimen per his preference    Not drinking soda    Eating sweets 4-5d/wk    Exceeding 150 gt/d 2-3d/wk for chips/crackers/pretzels/nuts    Restaurant food 4x/wk    Milk average 12-16 oz/d (2% or skim)    Drinking DD latte on avg once weekly    Not walking for exercise  Assessment  - Improved, he cannot afford a weight loss drug and is hesitant about the interaction with his other medications; does not want to count calories, but is willing to become calorie conscious  Plan   -   Patient Instructions   For 1 lb/5 days wt loss, calorie intake must be below 1800 gt/day  Walk 30 min/day  Establish 8 hours of food-free periods each day: 2 hours before bed time + 6 hours throughout the day, no period less than one hour and they must be the same every day.   Limit sweets to one day per month  Limit restaurant food to once every two weeks  Limit chips/nuts/crackers/pretzels to 150 gt/day  Stop all Sugar Sweetened Beverages  Limit milk to 8 oz of 2%/day  Stop all sugar and flavored creamer in coffee  See me in six weeks      Clifton Ash MD  Endocrinology/Obesity  3/29/21

## 2021-04-08 ENCOUNTER — OFFICE VISIT (OUTPATIENT)
Dept: SLEEP MEDICINE | Age: 31
End: 2021-04-08
Payer: COMMERCIAL

## 2021-04-08 VITALS
DIASTOLIC BLOOD PRESSURE: 80 MMHG | BODY MASS INDEX: 43.56 KG/M2 | HEIGHT: 68 IN | RESPIRATION RATE: 16 BRPM | WEIGHT: 287.4 LBS | HEART RATE: 93 BPM | SYSTOLIC BLOOD PRESSURE: 124 MMHG | OXYGEN SATURATION: 99 %

## 2021-04-08 DIAGNOSIS — G47.33 OBSTRUCTIVE SLEEP APNEA: Primary | ICD-10-CM

## 2021-04-08 DIAGNOSIS — G47.19 EXCESSIVE DAYTIME SLEEPINESS: ICD-10-CM

## 2021-04-08 DIAGNOSIS — E66.9 OBESITY, UNSPECIFIED CLASSIFICATION, UNSPECIFIED OBESITY TYPE, UNSPECIFIED WHETHER SERIOUS COMORBIDITY PRESENT: ICD-10-CM

## 2021-04-08 DIAGNOSIS — F90.9 ATTENTION DEFICIT HYPERACTIVITY DISORDER (ADHD), UNSPECIFIED ADHD TYPE: ICD-10-CM

## 2021-04-08 DIAGNOSIS — E03.8 SUBCLINICAL HYPOTHYROIDISM: ICD-10-CM

## 2021-04-08 DIAGNOSIS — G25.81 RLS (RESTLESS LEGS SYNDROME): ICD-10-CM

## 2021-04-08 DIAGNOSIS — G47.00 INSOMNIA, UNSPECIFIED TYPE: ICD-10-CM

## 2021-04-08 LAB
ALBUMIN SERPL-MCNC: 4.3 G/DL (ref 3.5–5.2)
ALP BLD-CCNC: 52 U/L (ref 40–129)
ALT SERPL-CCNC: 93 U/L (ref 0–40)
ANION GAP SERPL CALCULATED.3IONS-SCNC: 14 MMOL/L (ref 7–16)
AST SERPL-CCNC: 48 U/L (ref 0–39)
BASOPHILS ABSOLUTE: 0.06 E9/L (ref 0–0.2)
BASOPHILS RELATIVE PERCENT: 0.8 % (ref 0–2)
BILIRUB SERPL-MCNC: 0.5 MG/DL (ref 0–1.2)
BUN BLDV-MCNC: 16 MG/DL (ref 6–20)
CALCIUM SERPL-MCNC: 9.5 MG/DL (ref 8.6–10.2)
CHLORIDE BLD-SCNC: 108 MMOL/L (ref 98–107)
CHOLESTEROL, TOTAL: 174 MG/DL (ref 0–199)
CO2: 22 MMOL/L (ref 22–29)
CREAT SERPL-MCNC: 0.9 MG/DL (ref 0.7–1.2)
EOSINOPHILS ABSOLUTE: 0.29 E9/L (ref 0.05–0.5)
EOSINOPHILS RELATIVE PERCENT: 4 % (ref 0–6)
GFR AFRICAN AMERICAN: >60
GFR NON-AFRICAN AMERICAN: >60 ML/MIN/1.73
GLUCOSE BLD-MCNC: 91 MG/DL (ref 74–99)
HCT VFR BLD CALC: 46.2 % (ref 37–54)
HDLC SERPL-MCNC: 34 MG/DL
HEMOGLOBIN: 15.3 G/DL (ref 12.5–16.5)
IMMATURE GRANULOCYTES #: 0.02 E9/L
IMMATURE GRANULOCYTES %: 0.3 % (ref 0–5)
LDL CHOLESTEROL CALCULATED: 117 MG/DL (ref 0–99)
LYMPHOCYTES ABSOLUTE: 3.01 E9/L (ref 1.5–4)
LYMPHOCYTES RELATIVE PERCENT: 41.2 % (ref 20–42)
MCH RBC QN AUTO: 31.5 PG (ref 26–35)
MCHC RBC AUTO-ENTMCNC: 33.1 % (ref 32–34.5)
MCV RBC AUTO: 95.1 FL (ref 80–99.9)
MONOCYTES ABSOLUTE: 0.73 E9/L (ref 0.1–0.95)
MONOCYTES RELATIVE PERCENT: 10 % (ref 2–12)
NEUTROPHILS ABSOLUTE: 3.19 E9/L (ref 1.8–7.3)
NEUTROPHILS RELATIVE PERCENT: 43.7 % (ref 43–80)
PDW BLD-RTO: 15.1 FL (ref 11.5–15)
PLATELET # BLD: 276 E9/L (ref 130–450)
PMV BLD AUTO: 10.6 FL (ref 7–12)
POTASSIUM SERPL-SCNC: 4.4 MMOL/L (ref 3.5–5)
RBC # BLD: 4.86 E12/L (ref 3.8–5.8)
SODIUM BLD-SCNC: 144 MMOL/L (ref 132–146)
T4 FREE: 1.23 NG/DL (ref 0.93–1.7)
TOTAL PROTEIN: 7.3 G/DL (ref 6.4–8.3)
TRIGL SERPL-MCNC: 117 MG/DL (ref 0–149)
TSH SERPL DL<=0.05 MIU/L-ACNC: 3.05 UIU/ML (ref 0.27–4.2)
VLDLC SERPL CALC-MCNC: 23 MG/DL
WBC # BLD: 7.3 E9/L (ref 4.5–11.5)

## 2021-04-08 PROCEDURE — 99214 OFFICE O/P EST MOD 30 MIN: CPT | Performed by: STUDENT IN AN ORGANIZED HEALTH CARE EDUCATION/TRAINING PROGRAM

## 2021-04-08 PROCEDURE — 4004F PT TOBACCO SCREEN RCVD TLK: CPT | Performed by: STUDENT IN AN ORGANIZED HEALTH CARE EDUCATION/TRAINING PROGRAM

## 2021-04-08 PROCEDURE — G8427 DOCREV CUR MEDS BY ELIG CLIN: HCPCS | Performed by: STUDENT IN AN ORGANIZED HEALTH CARE EDUCATION/TRAINING PROGRAM

## 2021-04-08 PROCEDURE — G8417 CALC BMI ABV UP PARAM F/U: HCPCS | Performed by: STUDENT IN AN ORGANIZED HEALTH CARE EDUCATION/TRAINING PROGRAM

## 2021-04-08 ASSESSMENT — SLEEP AND FATIGUE QUESTIONNAIRES
HOW LIKELY ARE YOU TO NOD OFF OR FALL ASLEEP IN A CAR, WHILE STOPPED FOR A FEW MINUTES IN TRAFFIC: 0
HOW LIKELY ARE YOU TO NOD OFF OR FALL ASLEEP WHILE SITTING INACTIVE IN A PUBLIC PLACE: 1
HOW LIKELY ARE YOU TO NOD OFF OR FALL ASLEEP WHEN YOU ARE A PASSENGER IN A CAR FOR AN HOUR WITHOUT A BREAK: 1
HOW LIKELY ARE YOU TO NOD OFF OR FALL ASLEEP WHILE SITTING AND TALKING TO SOMEONE: 0
HOW LIKELY ARE YOU TO NOD OFF OR FALL ASLEEP WHILE WATCHING TV: 3

## 2021-04-08 NOTE — PATIENT INSTRUCTIONS
It was a pleasure seeing you today Sanjeev Crouch! Summary of Today's Visit            Please call our sleep nurses to schedule a follow up at - [phone] option 2              1. Continue using your machine nightly     ------------Please bring your machine/Data Card to every visit. -------------     -Request all data downloads be sent to my nurse        2. Contact your DME company about supplies or issues with your machine. As a general guideline, please replace your:  -CPAP mask every 3-6 months  -CPAP hose  every 3-6 months  -Filter every 2-4 weeks  -CPAP/BiPAP/ASV replacements every 5-7+ years     3. Continue healthy weight loss/stabilization, as this is recommended as a long-term intervention. 4. Please do not drive or operate machinery when you feel fatigued/sleepy/drowsy      5. Please try to sleep between 6-8 hours nightly with the CPAP mask    6. Please avoid sedatives, alcohol and caffeinated drinks at/near bed time. 7. Please call your supply (DME) company with any issues with your PAP device. Please call our office with any issues pertaining to the therapy.   ----Please note that complications of BLADE if not treated can increase risk for: systemic hypertension, pulmonary hypertension, cardiovascular morbidities (heart attack and stroke), car accidents and all cause mortality. For general questions or scheduling issues, call my nurse at [phone] option [phone] [phone] option 2  F- [phone]           ----------------------------------------------------------  Common Issues and Solutions     Pillow is dislodging mask - Consider getting a CPAP pillow or switching to a mask with the hose at the top. Dry Mouth - Adjust Humidity and/or try Biotene Gel. (Excessive leak can also cause this)     Leak - Please call your equipment provider  as they may need to adjust your mask.      Difficulty tolerating the common type. This type is called NCPAP. The N stands for \"nasal.\"  Why is it done? CPAP is a common/effective treatment for obstructive sleep apnea. How does it help? · CPAP can help you have more normal sleep, so you feel less sleepy and more alert during the daytime through the treatment of sleep apnea. · CPAP may help keep heart failure or other heart problems from getting worse. · CPAP may help lower your blood pressure. · If you use CPAP, your bed partner may also sleep better because you are snoring less. What are the side effects? Some people who use CPAP have:  · A dry or stuffy nose and a sore throat. · Irritated skin on the face. · Sore eyes. · Bloating. If you have any of these problems, work with your doctor to fix them. Here are some things you can try:  · Be sure the mask or nasal prongs fit well. · See if your doctor can adjust the pressure of your CPAP. · If your nose is dry, try a humidifier. If these things do not help, you might try a different type of machine. Some machines have air pressure that adjusts on its own. Others have air pressures that are different when you breathe in than when you breathe out. This may reduce discomfort caused by too much pressure in your nose. Where can you learn more? Go to https://[employer] org and sign in to your Rhaps[employer] account. Enter Y614 in the [facility] box to learn more about \"Learning About CPAP for Sleep Apnea. \"     If you do not have an account, please click on the \"Sign Up Now\" link. Current as of: February 24, 2020               Content Version: 12.5  © 5579-2746 Healthwise, Incorporated. Care instructions adapted under license by [facility] [place] ([facility]). If you have questions about a medical condition or this instruction, always ask your healthcare professional. [address] any warranty or liability for your use of this information.

## 2021-04-08 NOTE — PROGRESS NOTES
Ang Maloney is a 26 y.o. male who is being seen today for follow up of sleep apnea      Review of Last Visit Summary:    Last seen by physician assistant Lowell Cho on 8/12/2020 for sleep apnea, excessive daytime sleepiness, chronic sleep maintenance insomnia, morbid obesity, and restless leg syndrome. Interim History:     Ang Maloney is a 26 y.o. male in office for sleep apnea follow up. Patient reports increased tolerance to Pap therapy. He notes some benefit when he uses his Pap device. Insomnia-decreased recently. His wife recently delivered and he currently has a 1month-old. He states that he still has stress, but that it is a different type. He feels that he is more able to manage it. He was unable to establish with psychology. He is interested in following with a new psychologist.    RLS- Decreased even further since last visit. Occasional symptoms quickly reduced with stretching      Benefits: Less snoring  DME: [email or web address]    Compliance download report reviewed today by me demonstrated the following:    Dates 3/7/2021 till 4/5/2021    Settings -8-16 CWP   days used -26 out of 30  >4 hours-87%  AHI - 0.6  Leak - 14.7  Average usage days used 7 hours 14 minutes            Past Medical History:  Past Medical History:   Diagnosis Date    ADHD (attention deficit hyperactivity disorder)     Class 3 severe obesity due to excess calories without serious comorbidity with body mass index (BMI) of 40.0 to 44.9 in adult [facility]) 4/30/2019    Depression     Sleep apnea     Tobacco abuse 4/3/2019       Past Surgical History:        Procedure Laterality Date    FOOT SURGERY Left 2009       Allergies:  has No Known Allergies.   Social History:    Social History     Tobacco Use    Smoking status: Current Some Day Smoker     Packs/day: 1.00     Years: 10.00     Pack years: 10.00     Types: Cigars    Smokeless tobacco: Never Used   Substance Use Topics    Alcohol use: Yes     Comment: social    Drug use: No        Family History:       Problem Relation Age of Onset    Heart Disease Mother     Heart Attack Father     Heart Disease Father     No Known Problems Sister        Current Medications:    Current Outpatient Medications:     pantoprazole (PROTONIX) 20 MG tablet, Take 1 tablet by mouth every morning (before breakfast), Disp: 30 tablet, Rfl: 2    QUEtiapine (SEROQUEL XR) 50 MG extended release tablet, Take 1 tablet by mouth nightly, Disp: 30 tablet, Rfl: 5    venlafaxine (EFFEXOR XR) 150 MG extended release capsule, Take 1 capsule by mouth daily, Disp: 30 capsule, Rfl: 5    [START ON 4/22/2021] amphetamine-dextroamphetamine (ADDERALL XR) 30 MG extended release capsule, Take 1 capsule by mouth every morning for 30 days. , Disp: 30 capsule, Rfl: 0    venlafaxine (EFFEXOR XR) 75 MG extended release capsule, Take 225 mg daily, Disp: 30 capsule, Rfl: 5    acetaminophen (TYLENOL) 325 MG tablet, Take 650 mg by mouth every 6 hours as needed for Pain, Disp: , Rfl:     Multiple Vitamins-Minerals (THERAPEUTIC MULTIVITAMIN-MINERALS) tablet, Take 1 tablet by mouth daily, Disp: , Rfl:           Objective:   PHYSICAL EXAM:    /80 (Site: Left Upper Arm, Position: Sitting, Cuff Size: Large Adult)   Pulse 93   Resp 16   Ht 5' 8\" (1.727 m)   Wt 287 lb 6.4 oz (130.4 kg)   SpO2 99%   BMI 43.70 kg/m²     (Sleep ROS above)     Constitutional: no chills, no fever   Eyes: no blurred vision and no eyesight problems. ENT: no hoarseness and no sore throat. Cardiovascular: no chest pain,   Respiratory: no cough, no shortness of breath   Gastrointestinal:  no nausea,  no vomiting, no diarrhea. Musculoskeletal: no arthralgias, no back pain and no myalgias. Integumentary: no rashes or lacerations. Neurological:  no diplopia, no dizziness,  no headache, no memory changes,  and no tingling. Endocrine: No chills      Physical exam:  Gen: No acute distress. BMI of Body mass index is 43.7 kg/m².   Eyes: PERRL. No sclera icterus. No conjunctival injection. ENT: No nasal/oral discharge. Pharynx clear. Neck: Trachea midline. No obvious mass. Neck circumference Neck circumference: 18   Resp: No accessory muscle use. No crackles. No wheezes. No rhonchi. CV: Regular rate. Regular rhythm. No murmur or rub. Skin: Warm and dry. No bleeding observed   M/S: No cyanosis. No obvious joint deformity. Neuro: Awake. Alert. Moves all four extremities. Psych: Alert and oriented. No anxiety. Sleep Medicine 4/8/2021 11/17/2020 8/12/2020   Sitting and reading 1 1 -   Watching TV 3 3 -   Sitting, inactive in a public place (e.g. a theatre or a meeting) 1 3 -   As a passenger in a car for an hour without a break 1 1 -   Lying down to rest in the afternoon when circumstances permit 3 3 -   Sitting and talking to someone 0 0 -   Sitting quietly after a lunch without alcohol 1 2 -   In a car, while stopped for a few minutes in traffic 0 0 -   Total score 10 13 -   Neck circumference 18 18 17.5       DATA:     Outside sleep study from [facility] [place]. Date 6/14/2016. AHI 10.2. Mean SPO2 was 90%. Recommendation CPAP 9. Assessment:      Michelle Taylor was seen today for sleep apnea. Diagnoses and all orders for this visit:    Obstructive sleep apnea  -     DME Order for CPAP as OP    Insomnia, unspecified type  -     Amb External Referral To Psychology    Obesity, unspecified classification, unspecified obesity type, unspecified whether serious comorbidity present    RLS (restless legs syndrome)    Excessive daytime sleepiness    ·    Plan:       1. BLADE. Tolerating slightly more than previously. - Continue current PAP settings    2. Insomnia/Anxiety. Decreased diffiuclty. His wife delivered their child without issues and he currently has a 4 month old. He states that he still has stress, but that it is differnet and more manageable. He was unable to contact any of the previous psychologists.  He will be referred to psychology in [facility]. 3.  Restless leg syndrome. Decreased even more since last visit. He reports it is seldomly an issue. Patient notes occasional symptoms are reduced with stretching. Education provided in handout. Will consider ferritin testing at future date if symptoms increase. 4.  Excessive daytime sleepiness. ESS 10. Improved since last visit. Patient is currently on Adderall for ADHD, which helps his concentration. We will follow-up at next visit. 5. Obesity. Body mass index is 43.7 kg/m². Patient is established with Dr. Prasanna Brower for medical weight loss.   -Healthy weight loss advised        Follow up: Return in about 6 months (around 10/8/2021) for Follow up BLADE.

## 2021-04-29 ENCOUNTER — IMMUNIZATION (OUTPATIENT)
Dept: PRIMARY CARE CLINIC | Age: 31
End: 2021-04-29
Payer: COMMERCIAL

## 2021-04-29 PROCEDURE — 0002A COVID-19, PFIZER VACCINE 30MCG/0.3ML DOSE: CPT | Performed by: NURSE PRACTITIONER

## 2021-04-29 PROCEDURE — 91300 COVID-19, PFIZER VACCINE 30MCG/0.3ML DOSE: CPT | Performed by: NURSE PRACTITIONER

## 2021-05-13 ENCOUNTER — OFFICE VISIT (OUTPATIENT)
Dept: BARIATRICS/WEIGHT MGMT | Age: 31
End: 2021-05-13
Payer: COMMERCIAL

## 2021-05-13 VITALS
HEIGHT: 67 IN | WEIGHT: 284.6 LBS | HEART RATE: 101 BPM | DIASTOLIC BLOOD PRESSURE: 90 MMHG | TEMPERATURE: 97.3 F | SYSTOLIC BLOOD PRESSURE: 145 MMHG | BODY MASS INDEX: 44.67 KG/M2

## 2021-05-13 DIAGNOSIS — G47.33 OBSTRUCTIVE SLEEP APNEA SYNDROME: Primary | ICD-10-CM

## 2021-05-13 DIAGNOSIS — E66.01 CLASS 3 SEVERE OBESITY DUE TO EXCESS CALORIES WITHOUT SERIOUS COMORBIDITY WITH BODY MASS INDEX (BMI) OF 40.0 TO 44.9 IN ADULT (HCC): ICD-10-CM

## 2021-05-13 PROCEDURE — G8428 CUR MEDS NOT DOCUMENT: HCPCS | Performed by: INTERNAL MEDICINE

## 2021-05-13 PROCEDURE — 4004F PT TOBACCO SCREEN RCVD TLK: CPT | Performed by: INTERNAL MEDICINE

## 2021-05-13 PROCEDURE — 99211 OFF/OP EST MAY X REQ PHY/QHP: CPT

## 2021-05-13 PROCEDURE — 99213 OFFICE O/P EST LOW 20 MIN: CPT | Performed by: INTERNAL MEDICINE

## 2021-05-13 PROCEDURE — G8417 CALC BMI ABV UP PARAM F/U: HCPCS | Performed by: INTERNAL MEDICINE

## 2021-05-13 NOTE — PATIENT INSTRUCTIONS
For 1 lb/5 days wt loss, calorie intake must be below 1800 gt/day  Walk 30 min/day  Establish 8 hours of food-free periods each day: 2 hours before bed time + 6 hours throughout the day, no period less than one hour and they must be the same every day.   Limit sweets to one day per month  Limit restaurant food to once every two weeks  Limit chips/nuts/crackers/pretzels to 150 gt/day (about two handfuls)  Stop all Sugar Sweetened Beverages  Limit milk to 8 oz of 2%/day  Stop all sugar and flavored creamer in coffee  See me in six weeks

## 2021-05-13 NOTE — PROGRESS NOTES
CC -   BLADE, Obesity    Background -   Last visit:   2/15/21  First visit: 12/09/20     · BLADE  Diagnosed 2016  Moderate in severity  Using a CPAP  Daytime sleepiness: 4-9/10 (greatest when activity is low)  Daytime fatigue: 7-8/10  Restorative: 1/10    · Obesity  Began in childhood  Initial BMI 48.5, Wt 304.8 lbs  HS Grad wt 230 lbs  Lowest   wt 230 lbs  Highest  wt 310 lbs  Pattern of wt gain: grad until the past year  Wt change past yr: 30 lbs  Most wt lost: 10 lbs (stopped soda)  Other diets attempted: none    Desire to lose weight: 10/10  Problem posed by appetite: 6-7/10     Initial Diet:    Number of meals per day - 2-3    Number of snacks per day - 4-5    Meal volume - 12\" plate, sometimes seconds    Fast food/convenience store - 2-4x/week    Restaurants (not fast food) - 0-1x/week   Sweets - 7d/week (Keebler Fudge Stripe Cookies 8-10, lots of other sweet snacks)   Chips - 5-7d/week (Large portion of a large bag)   Crackers/pretzels - 0-1d/week   Nuts - 1-3d/week (5-7 handfuls)   Peanut Butter - 0-3d/week   Popcorn - 0d/week   Dried fruit - 0d/week   Whole fruit - 0d/week   Breakfast cereal - 0-2d/week (Cinnamon Toast Crunch)   Granola/Protein/Energy bar - 0d/week   Sugar sweetened beverages - 60-80 oz reg soda/wk, 64oz 2% milk/d, 4-7 cups coffee/wk, each with 3-4 tablespoons of sugar with 3-6 oz of flavored creamer in each    Protein - No supplements   Fiber - No supplements     Exercise:    Gym membership - Private gym in [place], but does not go    Walking - None    Running - None    Resistance - None    Aerobic class - None    ______________________    [address] [place] [address] -  Past Medical History:   Diagnosis Date    ADHD (attention deficit hyperactivity disorder)     Class 3 severe obesity due to excess calories without serious comorbidity with body mass index (BMI) of 40.0 to 44.9 in adult [facility]) 4/30/2019    Depression     Sleep apnea     Tobacco abuse 4/3/2019     Current Outpatient Medications   Medication Sig Dispense Refill    pantoprazole (PROTONIX) 20 MG tablet Take 1 tablet by mouth every morning (before breakfast) 30 tablet 2    QUEtiapine (SEROQUEL XR) 50 MG extended release tablet Take 1 tablet by mouth nightly 30 tablet 5    venlafaxine (EFFEXOR XR) 150 MG extended release capsule Take 1 capsule by mouth daily 30 capsule 5    amphetamine-dextroamphetamine (ADDERALL XR) 30 MG extended release capsule Take 1 capsule by mouth every morning for 30 days. 30 capsule 0    venlafaxine (EFFEXOR XR) 75 MG extended release capsule Take 225 mg daily 30 capsule 5    acetaminophen (TYLENOL) 325 MG tablet Take 650 mg by mouth every 6 hours as needed for Pain      Multiple Vitamins-Minerals (THERAPEUTIC MULTIVITAMIN-MINERALS) tablet Take 1 tablet by mouth daily       No current facility-administered medications for this visit. ROS -  Gen - no fever  Pulm - no cough    PE -  Gen : BP (!) 145/90 (Site: Left Upper Arm, Position: Sitting, Cuff Size: Large Adult)   Pulse 101   Temp 97.3 °F (36.3 °C) (Temporal)   Ht 5' 6.5\" (1.689 m)   Wt 284 lb 9.6 oz (129.1 kg)   BMI 45.25 kg/m²    WN, WD, NAD  Lung: Nml resp effort  Psych: Normal mood   Full affect  Neuro:  Moves all ext well  ______________________      HPI & A/P -   Problem 1  - BLADE  HPI   - See above Background for description      Compliant with CPAP      Daytime sleepiness for past week: 4-5/10       Excess weight is worsening his underlying airway obstruction  Assessment  - Uncontrolled  Plan   - Cont with nightly CPAP      Proceed with wt reduction per the plan below    Problem 2  - Obesity   HPI   - See above Background for description      Weight Date      304.8 lbs 12/09/20      300.0 lbs 02/15/21      290.8 lbs 03/29/21      284.6 lbs 05/13/21       Total weight loss to date: 14.0 lbs    DEN = 2650 gt/day = 18,550 gt/wk    Avg daily energy deficit:       12/09/20-02/15/21 = 16,800/67d = 250 gt/day       02/15/21-03/29/21 = 9.2 lbs/42d =

## 2021-05-27 ENCOUNTER — OFFICE VISIT (OUTPATIENT)
Dept: FAMILY MEDICINE CLINIC | Age: 31
End: 2021-05-27
Payer: COMMERCIAL

## 2021-05-27 VITALS
SYSTOLIC BLOOD PRESSURE: 120 MMHG | OXYGEN SATURATION: 97 % | RESPIRATION RATE: 16 BRPM | HEIGHT: 68 IN | DIASTOLIC BLOOD PRESSURE: 80 MMHG | WEIGHT: 285 LBS | HEART RATE: 72 BPM | TEMPERATURE: 96.9 F | BODY MASS INDEX: 43.19 KG/M2

## 2021-05-27 DIAGNOSIS — F90.9 ATTENTION DEFICIT HYPERACTIVITY DISORDER (ADHD), UNSPECIFIED ADHD TYPE: Primary | ICD-10-CM

## 2021-05-27 DIAGNOSIS — K21.9 GASTROESOPHAGEAL REFLUX DISEASE, UNSPECIFIED WHETHER ESOPHAGITIS PRESENT: ICD-10-CM

## 2021-05-27 DIAGNOSIS — F32.A MILD DEPRESSION: ICD-10-CM

## 2021-05-27 DIAGNOSIS — F41.9 ANXIETY: ICD-10-CM

## 2021-05-27 PROCEDURE — 99214 OFFICE O/P EST MOD 30 MIN: CPT | Performed by: FAMILY MEDICINE

## 2021-05-27 PROCEDURE — 4004F PT TOBACCO SCREEN RCVD TLK: CPT | Performed by: FAMILY MEDICINE

## 2021-05-27 PROCEDURE — G8417 CALC BMI ABV UP PARAM F/U: HCPCS | Performed by: FAMILY MEDICINE

## 2021-05-27 PROCEDURE — G8428 CUR MEDS NOT DOCUMENT: HCPCS | Performed by: FAMILY MEDICINE

## 2021-05-27 RX ORDER — DEXTROAMPHETAMINE SACCHARATE, AMPHETAMINE ASPARTATE MONOHYDRATE, DEXTROAMPHETAMINE SULFATE AND AMPHETAMINE SULFATE 7.5; 7.5; 7.5; 7.5 MG/1; MG/1; MG/1; MG/1
30 CAPSULE, EXTENDED RELEASE ORAL DAILY
Qty: 30 CAPSULE | Refills: 0 | Status: SHIPPED | OUTPATIENT
Start: 2021-07-27 | End: 2021-08-30 | Stop reason: SDUPTHER

## 2021-05-27 RX ORDER — PANTOPRAZOLE SODIUM 20 MG/1
20 TABLET, DELAYED RELEASE ORAL
Qty: 30 TABLET | Refills: 5 | Status: SHIPPED
Start: 2021-05-27 | End: 2022-03-09 | Stop reason: SDUPTHER

## 2021-05-27 RX ORDER — DEXTROAMPHETAMINE SACCHARATE, AMPHETAMINE ASPARTATE MONOHYDRATE, DEXTROAMPHETAMINE SULFATE AND AMPHETAMINE SULFATE 7.5; 7.5; 7.5; 7.5 MG/1; MG/1; MG/1; MG/1
30 CAPSULE, EXTENDED RELEASE ORAL EVERY MORNING
Qty: 30 CAPSULE | Refills: 0 | Status: SHIPPED | OUTPATIENT
Start: 2021-05-27 | End: 2021-08-30 | Stop reason: SDUPTHER

## 2021-05-27 RX ORDER — VENLAFAXINE HYDROCHLORIDE 75 MG/1
CAPSULE, EXTENDED RELEASE ORAL
Qty: 30 CAPSULE | Refills: 5 | Status: SHIPPED
Start: 2021-05-27 | End: 2022-03-09 | Stop reason: SDUPTHER

## 2021-05-27 RX ORDER — DEXTROAMPHETAMINE SACCHARATE, AMPHETAMINE ASPARTATE MONOHYDRATE, DEXTROAMPHETAMINE SULFATE AND AMPHETAMINE SULFATE 7.5; 7.5; 7.5; 7.5 MG/1; MG/1; MG/1; MG/1
30 CAPSULE, EXTENDED RELEASE ORAL EVERY MORNING
Qty: 30 CAPSULE | Refills: 0 | Status: SHIPPED | OUTPATIENT
Start: 2021-06-27 | End: 2021-08-30 | Stop reason: SDUPTHER

## 2021-05-27 ASSESSMENT — ENCOUNTER SYMPTOMS
VOMITING: 0
ABDOMINAL PAIN: 0
SHORTNESS OF BREATH: 0
DIARRHEA: 0
NAUSEA: 0
COUGH: 0
CONSTIPATION: 0

## 2021-05-27 NOTE — PROGRESS NOTES
concentration (improving) and dysphoric mood (improving). The patient is not nervous/anxious. Prior to Visit Medications    Medication Sig Taking? Authorizing Provider   pantoprazole (PROTONIX) 20 MG tablet Take 1 tablet by mouth every morning (before breakfast) Yes Riaz Pierce DO   QUEtiapine (SEROQUEL XR) 50 MG extended release tablet Take 1 tablet by mouth nightly Yes Riaz Pierce DO   venlafaxine (EFFEXOR XR) 150 MG extended release capsule Take 1 capsule by mouth daily Yes Riaz Pierce DO   amphetamine-dextroamphetamine (ADDERALL XR) 30 MG extended release capsule Take 1 capsule by mouth every morning for 30 days. Yes Riaz Pierce DO   venlafaxine (EFFEXOR XR) 75 MG extended release capsule Take 225 mg daily Yes Riaz Pierce DO   acetaminophen (TYLENOL) 325 MG tablet Take 650 mg by mouth every 6 hours as needed for Pain Yes Historical Provider, MD   Multiple Vitamins-Minerals (THERAPEUTIC MULTIVITAMIN-MINERALS) tablet Take 1 tablet by mouth daily Yes Historical Provider, MD        Social History     Tobacco Use    Smoking status: Current Some Day Smoker     Packs/day: 1.00     Years: 10.00     Pack years: 10.00     Types: Cigars    Smokeless tobacco: Never Used   Substance Use Topics    Alcohol use: Yes     Comment: social        Past Surgical History:   Procedure Laterality Date    FOOT SURGERY Left 2009       Vitals:    05/27/21 0934   BP: 120/80   Pulse: 72   Resp: 16   Temp: 96.9 °F (36.1 °C)   TempSrc: Temporal   SpO2: 97%   Weight: 285 lb (129.3 kg)   Height: 5' 8\" (1.727 m)     Estimated body mass index is 43.33 kg/m² as calculated from the following:    Height as of this encounter: 5' 8\" (1.727 m). Weight as of this encounter: 285 lb (129.3 kg). Physical Exam  Constitutional:       General: He is not in acute distress. Appearance: He is well-developed. He is obese. HENT:      Head: Normocephalic and atraumatic.       Right Ear: External ear normal.      Left Ear: External ear normal.   Eyes:      Extraocular Movements: Extraocular movements intact. Pupils: Pupils are equal, round, and reactive to light. Neck:      Thyroid: No thyromegaly. Cardiovascular:      Rate and Rhythm: Normal rate and regular rhythm. Pulmonary:      Effort: Pulmonary effort is normal. No respiratory distress. Breath sounds: Normal breath sounds. No wheezing or rales. Abdominal:      General: There is no distension. Palpations: Abdomen is soft. Tenderness: There is no abdominal tenderness. Musculoskeletal:         General: No swelling or deformity. Neurological:      General: No focal deficit present. Mental Status: He is alert. Mental status is at baseline. Psychiatric:         Mood and Affect: Mood normal.         Behavior: Behavior normal.         ASSESSMENT/PLAN:  Carmina Khanna was seen today for 3 month follow-up, adhd and discuss labs. Diagnoses and all orders for this visit:    Attention deficit hyperactivity disorder (ADHD), unspecified ADHD type  -     amphetamine-dextroamphetamine (ADDERALL XR) 30 MG extended release capsule; Take 1 capsule by mouth every morning for 30 days. -     amphetamine-dextroamphetamine (ADDERALL XR) 30 MG extended release capsule; Take 1 capsule by mouth every morning for 30 days. -     amphetamine-dextroamphetamine (ADDERALL XR) 30 MG extended release capsule; Take 1 capsule by mouth daily for 30 days. Mild depression (HCC)  -     venlafaxine (EFFEXOR XR) 75 MG extended release capsule; Take 225 mg daily    Anxiety  -     venlafaxine (EFFEXOR XR) 75 MG extended release capsule; Take 225 mg daily    Gastroesophageal reflux disease, unspecified whether esophagitis present  -     pantoprazole (PROTONIX) 20 MG tablet; Take 1 tablet by mouth every morning (before breakfast)       Additional plan and future considerations:   As above.  RTO in 3 months or sooner if needed    Controlled substances monitoring: [place]ARRS report reviewed

## 2021-08-03 ENCOUNTER — OFFICE VISIT (OUTPATIENT)
Dept: BARIATRICS/WEIGHT MGMT | Age: 31
End: 2021-08-03
Payer: COMMERCIAL

## 2021-08-03 VITALS
HEART RATE: 80 BPM | SYSTOLIC BLOOD PRESSURE: 131 MMHG | WEIGHT: 280.4 LBS | BODY MASS INDEX: 44.01 KG/M2 | TEMPERATURE: 97.4 F | HEIGHT: 67 IN | DIASTOLIC BLOOD PRESSURE: 82 MMHG

## 2021-08-03 DIAGNOSIS — G47.33 OBSTRUCTIVE SLEEP APNEA SYNDROME: Primary | ICD-10-CM

## 2021-08-03 DIAGNOSIS — E66.01 CLASS 3 SEVERE OBESITY DUE TO EXCESS CALORIES WITHOUT SERIOUS COMORBIDITY WITH BODY MASS INDEX (BMI) OF 40.0 TO 44.9 IN ADULT (HCC): ICD-10-CM

## 2021-08-03 PROCEDURE — 99211 OFF/OP EST MAY X REQ PHY/QHP: CPT

## 2021-08-03 PROCEDURE — 4004F PT TOBACCO SCREEN RCVD TLK: CPT | Performed by: INTERNAL MEDICINE

## 2021-08-03 PROCEDURE — 99213 OFFICE O/P EST LOW 20 MIN: CPT | Performed by: INTERNAL MEDICINE

## 2021-08-03 PROCEDURE — G8417 CALC BMI ABV UP PARAM F/U: HCPCS | Performed by: INTERNAL MEDICINE

## 2021-08-03 PROCEDURE — G8428 CUR MEDS NOT DOCUMENT: HCPCS | Performed by: INTERNAL MEDICINE

## 2021-08-03 NOTE — PROGRESS NOTES
CC -   BLADE, Obesity    Background -   Last visit:   5/13/21  First visit: 12/09/20     · BLADE  Diagnosed 2016  Moderate in severity  Using a CPAP  Daytime sleepiness: 4-9/10 (greatest when activity is low)  Daytime fatigue: 7-8/10  Restorative: 1/10    · Obesity  Began in childhood  Initial BMI 48.5, Wt 304.8 lbs  HS Grad wt 230 lbs  Lowest   wt 230 lbs  Highest  wt 310 lbs  Pattern of wt gain: grad until the past year  Wt change past yr: +30 lbs  Most wt lost: 10 lbs (stopped soda)  Other diets attempted: none    Desire to lose weight: 10/10  Problem posed by appetite: 6-7/10     Initial Diet:    Number of meals per day - 2-3    Number of snacks per day - 4-5    Meal volume - 12\" plate, sometimes seconds    Fast food/convenience store - 2-4x/week    Restaurants (not fast food) - 0-1x/week   Sweets - 7d/week (Keebler Fudge Stripe Cookies 8-10, lots of other sweet snacks)   Chips - 5-7d/week (Large portion of a large bag)   Crackers/pretzels - 0-1d/week   Nuts - 1-3d/week (5-7 handfuls)   Peanut Butter - 0-3d/week   Popcorn - 0d/week   Dried fruit - 0d/week   Whole fruit - 0d/week   Breakfast cereal - 0-2d/week (Cinnamon Toast Crunch)   Granola/Protein/Energy bar - 0d/week   Sugar sweetened beverages - 60-80 oz reg soda/wk, 64oz 2% milk/d, 4-7 cups coffee/wk, each with 3-4 tablespoons of sugar with 3-6 oz of flavored creamer in each    Protein - No supplements   Fiber - No supplements     Exercise:    Gym membership - Private gym in [facility], but does not go    Walking - None    Running - None    Resistance - None    Aerobic class - None    ______________________    [facility] -  Past Medical History:   Diagnosis Date    ADHD (attention deficit hyperactivity disorder)     Class 3 severe obesity due to excess calories without serious comorbidity with body mass index (BMI) of 40.0 to 44.9 in adult [facility]) 4/30/2019    Depression     Sleep apnea     Tobacco abuse 4/3/2019     Current Outpatient Medications   Medication Sig Dispense Refill    venlafaxine (EFFEXOR XR) 75 MG extended release capsule Take 225 mg daily 30 capsule 5    amphetamine-dextroamphetamine (ADDERALL XR) 30 MG extended release capsule Take 1 capsule by mouth every morning for 30 days. 30 capsule 0    pantoprazole (PROTONIX) 20 MG tablet Take 1 tablet by mouth every morning (before breakfast) 30 tablet 5    amphetamine-dextroamphetamine (ADDERALL XR) 30 MG extended release capsule Take 1 capsule by mouth every morning for 30 days. 30 capsule 0    amphetamine-dextroamphetamine (ADDERALL XR) 30 MG extended release capsule Take 1 capsule by mouth daily for 30 days. 30 capsule 0    QUEtiapine (SEROQUEL XR) 50 MG extended release tablet Take 1 tablet by mouth nightly 30 tablet 5    venlafaxine (EFFEXOR XR) 150 MG extended release capsule Take 1 capsule by mouth daily 30 capsule 5    acetaminophen (TYLENOL) 325 MG tablet Take 650 mg by mouth every 6 hours as needed for Pain      Multiple Vitamins-Minerals (THERAPEUTIC MULTIVITAMIN-MINERALS) tablet Take 1 tablet by mouth daily       No current facility-administered medications for this visit. ROS -  Gen - no fever  Pulm - no cough    PE -  Gen : /82 (Site: Right Lower Arm, Position: Sitting, Cuff Size: Medium Adult)   Pulse 80   Temp 97.4 °F (36.3 °C) (Temporal)   Ht 5' 6.5\" (1.689 m)   Wt 280 lb 6.4 oz (127.2 kg)   BMI 44.58 kg/m²    WN, WD, NAD  Lung: Nml resp effort  Psych: Normal mood   Full affect  Neuro:  Moves all ext well  ______________________      HPI & A/P -   Problem 1  - BLADE  HPI   - See above Background for description      Compliant with CPAP      Daytime sleepiness for past week: 3-4/10       Excess weight is worsening his underlying airway obstruction  Assessment  - Uncontrolled  Plan   - Cont with nightly CPAP      Proceed with wt reduction per the plan below    Problem 2  - Obesity   HPI   - See above Background for description      Weight Date      304.8 lbs 12/09/20      300.0 they must be the same every day.   Limit sweets to one day per month  Limit restaurant food to once every two weeks  Limit chips/nuts/crackers/pretzels to 150 gt/day (about two handfuls)  Stop all Sugar Sweetened Beverages  Limit milk to 8 oz of 2%/day  Stop all sugar and flavored creamer in coffee  See me in 8-10 weeks    Dorinda Harp MD  Endocrinology/Obesity  8/3/21

## 2021-08-30 ENCOUNTER — OFFICE VISIT (OUTPATIENT)
Dept: FAMILY MEDICINE CLINIC | Age: 31
End: 2021-08-30
Payer: COMMERCIAL

## 2021-08-30 VITALS
SYSTOLIC BLOOD PRESSURE: 124 MMHG | OXYGEN SATURATION: 98 % | WEIGHT: 282 LBS | TEMPERATURE: 97.8 F | DIASTOLIC BLOOD PRESSURE: 80 MMHG | RESPIRATION RATE: 16 BRPM | HEIGHT: 68 IN | BODY MASS INDEX: 42.74 KG/M2 | HEART RATE: 78 BPM

## 2021-08-30 DIAGNOSIS — F32.A MILD DEPRESSION: ICD-10-CM

## 2021-08-30 DIAGNOSIS — F90.9 ATTENTION DEFICIT HYPERACTIVITY DISORDER (ADHD), UNSPECIFIED ADHD TYPE: Primary | ICD-10-CM

## 2021-08-30 DIAGNOSIS — G89.29 CHRONIC BILATERAL BACK PAIN, UNSPECIFIED BACK LOCATION: ICD-10-CM

## 2021-08-30 DIAGNOSIS — F39 MOOD DISORDER (HCC): ICD-10-CM

## 2021-08-30 DIAGNOSIS — M54.9 CHRONIC BILATERAL BACK PAIN, UNSPECIFIED BACK LOCATION: ICD-10-CM

## 2021-08-30 DIAGNOSIS — M25.562 ACUTE PAIN OF LEFT KNEE: ICD-10-CM

## 2021-08-30 PROCEDURE — 4004F PT TOBACCO SCREEN RCVD TLK: CPT | Performed by: FAMILY MEDICINE

## 2021-08-30 PROCEDURE — G8427 DOCREV CUR MEDS BY ELIG CLIN: HCPCS | Performed by: FAMILY MEDICINE

## 2021-08-30 PROCEDURE — G8417 CALC BMI ABV UP PARAM F/U: HCPCS | Performed by: FAMILY MEDICINE

## 2021-08-30 PROCEDURE — 99214 OFFICE O/P EST MOD 30 MIN: CPT | Performed by: FAMILY MEDICINE

## 2021-08-30 RX ORDER — DEXTROAMPHETAMINE SACCHARATE, AMPHETAMINE ASPARTATE MONOHYDRATE, DEXTROAMPHETAMINE SULFATE AND AMPHETAMINE SULFATE 7.5; 7.5; 7.5; 7.5 MG/1; MG/1; MG/1; MG/1
30 CAPSULE, EXTENDED RELEASE ORAL EVERY MORNING
Qty: 30 CAPSULE | Refills: 0 | Status: SHIPPED | OUTPATIENT
Start: 2021-09-30 | End: 2021-11-08 | Stop reason: SDUPTHER

## 2021-08-30 RX ORDER — VENLAFAXINE HYDROCHLORIDE 150 MG/1
150 CAPSULE, EXTENDED RELEASE ORAL DAILY
Qty: 30 CAPSULE | Refills: 5 | Status: SHIPPED
Start: 2021-08-30 | End: 2022-03-09 | Stop reason: SDUPTHER

## 2021-08-30 RX ORDER — DEXTROAMPHETAMINE SACCHARATE, AMPHETAMINE ASPARTATE MONOHYDRATE, DEXTROAMPHETAMINE SULFATE AND AMPHETAMINE SULFATE 7.5; 7.5; 7.5; 7.5 MG/1; MG/1; MG/1; MG/1
30 CAPSULE, EXTENDED RELEASE ORAL DAILY
Qty: 30 CAPSULE | Refills: 0 | Status: SHIPPED | OUTPATIENT
Start: 2021-10-30 | End: 2021-11-08 | Stop reason: SDUPTHER

## 2021-08-30 RX ORDER — MELOXICAM 15 MG/1
15 TABLET ORAL DAILY PRN
Qty: 30 TABLET | Refills: 1 | Status: SHIPPED
Start: 2021-08-30 | End: 2021-11-09

## 2021-08-30 RX ORDER — DEXTROAMPHETAMINE SACCHARATE, AMPHETAMINE ASPARTATE MONOHYDRATE, DEXTROAMPHETAMINE SULFATE AND AMPHETAMINE SULFATE 7.5; 7.5; 7.5; 7.5 MG/1; MG/1; MG/1; MG/1
30 CAPSULE, EXTENDED RELEASE ORAL EVERY MORNING
Qty: 30 CAPSULE | Refills: 0 | Status: SHIPPED | OUTPATIENT
Start: 2021-08-30 | End: 2021-11-08 | Stop reason: SDUPTHER

## 2021-08-30 RX ORDER — QUETIAPINE FUMARATE 50 MG/1
50 TABLET, EXTENDED RELEASE ORAL NIGHTLY
Qty: 30 TABLET | Refills: 5 | Status: SHIPPED
Start: 2021-08-30 | End: 2022-03-09 | Stop reason: SDUPTHER

## 2021-08-30 ASSESSMENT — ENCOUNTER SYMPTOMS
SHORTNESS OF BREATH: 0
VOMITING: 0
BACK PAIN: 1
COUGH: 0
CONSTIPATION: 0
NAUSEA: 0
ABDOMINAL PAIN: 0
DIARRHEA: 0

## 2021-08-30 NOTE — PROGRESS NOTES
2021     Reddy Thapa (:  1990) is a 26 y.o. male, with a:  Past Medical History:   Diagnosis Date    ADHD (attention deficit hyperactivity disorder)     Class 3 severe obesity due to excess calories without serious comorbidity with body mass index (BMI) of 40.0 to 44.9 in adult [facility]) 2019    Depression     Sleep apnea     Tobacco abuse 4/3/2019       Here for evaluation of the following medical concerns:  Chief Complaint   Patient presents with    ADHD    Knee Pain     follow up [facility] x 2 weeks ago left knee pain still bothers patient off and on not constant given ibuprofen 800mg      He also follows with sleep medicine, endocrinology, behavioral health    F/U of chronic problem(s) and recent complaint of knee pain  Chronic problems reviewed today include:  ADHD, depression and mood disorder  Current status of this/these condition(s):  stable  Tolerating meds:         Yes    ADD/ADHD   Current treatment: Adderall XR, which has been effective. Recent medication changes: None  Medication side effects: none.      Depression / Anxiety / Mood Disorder  Current treatment: Venlafaxine 225 milligrams daily and Seroquel XR 50 mg nightly  Recent medication changes: none venlafaxine previously increased  Onset was approximately several months ago, exacerbated by social stressors  Previous treatment includes individual therapy and medication. Symptoms are stable    Left knee pain  Onset a few weeks prior  He denies any trauma or inciting event  He was evaluated in urgent care underwent imaging  He reports a diagnosis of tendinitis and early arthritis  Previous treatment with ibuprofen  Symptoms are slowly but progressively improving    Chronic back pain  Chronic issue with intermittent symptoms  Current treatment includes chiropractic care and OTC medications as needed  Overall, symptoms are stable    Review of Systems   Constitutional: Negative for chills and fever.    Respiratory: Negative for cough and shortness of breath. Cardiovascular: Negative for chest pain and leg swelling. Gastrointestinal: Negative for abdominal pain, constipation, diarrhea, nausea and vomiting. Genitourinary: Negative for dysuria. Musculoskeletal: Positive for arthralgias and back pain. Neurological: Negative for headaches. Psychiatric/Behavioral: Positive for decreased concentration (improving) and dysphoric mood (improving). The patient is not nervous/anxious. Prior to Visit Medications    Medication Sig Taking? Authorizing Provider   venlafaxine (EFFEXOR XR) 75 MG extended release capsule Take 225 mg daily Yes Riaz Pierce DO   amphetamine-dextroamphetamine (ADDERALL XR) 30 MG extended release capsule Take 1 capsule by mouth every morning for 30 days. Yes Riaz Pierce DO   pantoprazole (PROTONIX) 20 MG tablet Take 1 tablet by mouth every morning (before breakfast) Yes Riaz Pierce DO   amphetamine-dextroamphetamine (ADDERALL XR) 30 MG extended release capsule Take 1 capsule by mouth every morning for 30 days. Yes Riaz Pierce DO   amphetamine-dextroamphetamine (ADDERALL XR) 30 MG extended release capsule Take 1 capsule by mouth daily for 30 days.  Yes Riaz Pierce DO   QUEtiapine (SEROQUEL XR) 50 MG extended release tablet Take 1 tablet by mouth nightly Yes Riaz Pierce DO   venlafaxine (EFFEXOR XR) 150 MG extended release capsule Take 1 capsule by mouth daily Yes Riaz Pierce DO   acetaminophen (TYLENOL) 325 MG tablet Take 650 mg by mouth every 6 hours as needed for Pain Yes Historical Provider, MD   Multiple Vitamins-Minerals (THERAPEUTIC MULTIVITAMIN-MINERALS) tablet Take 1 tablet by mouth daily Yes Historical Provider, MD        Social History     Tobacco Use    Smoking status: Current Some Day Smoker     Packs/day: 1.00     Years: 10.00     Pack years: 10.00     Types: Cigars    Smokeless tobacco: Never Used   Substance Use Topics    Alcohol use: Yes     Comment: social        Past Surgical History:   Procedure Laterality Date    FOOT SURGERY Left 2009       Vitals:    08/30/21 0929   BP: 124/80   Pulse: 78   Resp: 16   Temp: 97.8 °F (36.6 °C)   TempSrc: Temporal   SpO2: 98%   Weight: 282 lb (127.9 kg)   Height: 5' 8\" (1.727 m)     Estimated body mass index is 42.88 kg/m² as calculated from the following:    Height as of this encounter: 5' 8\" (1.727 m). Weight as of this encounter: 282 lb (127.9 kg). Physical Exam  Constitutional:       General: He is not in acute distress. Appearance: He is well-developed. He is obese. HENT:      Head: Normocephalic and atraumatic. Right Ear: External ear normal.      Left Ear: External ear normal.   Eyes:      Extraocular Movements: Extraocular movements intact. Pupils: Pupils are equal, round, and reactive to light. Neck:      Thyroid: No thyromegaly. Cardiovascular:      Rate and Rhythm: Normal rate and regular rhythm. Pulmonary:      Effort: Pulmonary effort is normal. No respiratory distress. Breath sounds: Normal breath sounds. No wheezing or rales. Abdominal:      General: There is no distension. Palpations: Abdomen is soft. Tenderness: There is no abdominal tenderness. Musculoskeletal:         General: No swelling or deformity. Left knee: No swelling, deformity or effusion. Normal range of motion. Tenderness present over the medial joint line. No LCL laxity, MCL laxity, ACL laxity or PCL laxity. Normal patellar mobility. Neurological:      General: No focal deficit present. Mental Status: He is alert. Mental status is at baseline. Psychiatric:         Mood and Affect: Mood normal.         Behavior: Behavior normal.         ASSESSMENT/PLAN:  Missy Nguyễn was seen today for adhd and knee pain.     Diagnoses and all orders for this visit:    Attention deficit hyperactivity disorder (ADHD), unspecified ADHD type  -     amphetamine-dextroamphetamine (ADDERALL XR) 30 MG extended release capsule; Take 1 capsule by mouth daily for 30 days. -     amphetamine-dextroamphetamine (ADDERALL XR) 30 MG extended release capsule; Take 1 capsule by mouth every morning for 30 days. -     amphetamine-dextroamphetamine (ADDERALL XR) 30 MG extended release capsule; Take 1 capsule by mouth every morning for 30 days. Mild depression (HCC)  -     venlafaxine (EFFEXOR XR) 150 MG extended release capsule; Take 1 capsule by mouth daily    Mood disorder (HCC)  -     QUEtiapine (SEROQUEL XR) 50 MG extended release tablet; Take 1 tablet by mouth nightly    Acute pain of left knee  -     meloxicam (MOBIC) 15 MG tablet; Take 1 tablet by mouth daily as needed for Pain    Chronic bilateral back pain, unspecified back location  -     meloxicam (MOBIC) 15 MG tablet; Take 1 tablet by mouth daily as needed for Pain         Additional plan and future considerations:   As above. RTO in 3 months or sooner if needed    Controlled substances monitoring: [place] report reviewed today- activity consistent with treatment plan.       Future Appointments   Date Time Provider Tanika Link   10/7/2021 12:30 PM Marlene Diane MD [facility]   10/14/2021  9:00 AM Dina Saldana DO [facility]   12/7/2021 10:45 AM DO Riley Lucio [facility]         --Florecita Rosen DO on 8/30/2021 at 9:38 AM

## 2021-08-30 NOTE — PATIENT INSTRUCTIONS
Patient Education        meloxicam (oral/injection)  Pronunciation:  yuliya OKS i juli  Brand:  Anjeso, Mobic, Qmiiz ODT, Vivlodex  What is the most important information I should know about meloxicam?  Meloxicam can increase your risk of fatal heart attack or stroke. Do not use this medicine just before or after heart bypass surgery (coronary artery bypass graft, or CABG). Meloxicam may also cause stomach or intestinal bleeding, which can be fatal.  What is meloxicam?  Meloxicam is a nonsteroidal anti-inflammatory drug (NSAID) that is used to treat osteoarthritis or rheumatoid arthritis in adults. Meloxicam is also used to treat juvenile rheumatoid arthritis in children who are at least 3years old. The Anjeso brand of meloxicam is used to treat moderate to severe pain in adults. Vivlodex is for use only in adults. Vigil Bola is for adults and children weighing at least 132 pounds (60 kilograms). Meloxicam may also be used for purposes not listed in this medication guide. What should I discuss with my healthcare provider before taking meloxicam?  Meloxicam can increase your risk of fatal heart attack or stroke, even if you don't have any risk factors. Do not use this medicine just before or after heart bypass surgery (coronary artery bypass graft, or CABG). Meloxicam may also cause stomach or intestinal bleeding, which can be fatal. These conditions can occur without warning while you are using meloxicam, especially in older adults. You should not use meloxicam if you are allergic to it, or if you have ever had an asthma attack or severe allergic reaction after taking aspirin or an NSAID. You should not take meloxicam disintegrating tablets (Qmiiz ODT) if you have phenylketonuria (PKU). This form of meloxicam contains phenylalanine.   Tell your doctor if you have ever had:  · heart disease, high blood pressure, high cholesterol, diabetes, or if you smoke;  · a heart attack, stroke, or blood clot;  · ulcers or bleeding in your stomach;  · asthma;  · kidney disease (or if you are on dialysis);  · liver disease; or  · fluid retention. If you are pregnant, you should not take meloxicam unless your doctor tells you to. Taking an NSAID during the last 20 weeks of pregnancy can cause serious heart or kidney problems in the unborn baby and possible complications with your pregnancy. Meloxicam may cause a delay in ovulation (the release of an egg from an ovary). You should not take meloxicam if you are undergoing fertility treatment, or are otherwise trying to get pregnant. It may not be safe to breastfeed while using this medicine. Ask your doctor about any risk. Meloxicam is not approved for use by anyone younger than 3years old. How should I take meloxicam?  Follow all directions on your prescription label and read all medication guides. Use the lowest dose that is effective in treating your condition. Meloxicam oral is taken by mouth. Meloxicam injection is given as an infusion into a vein. A healthcare provider will give you this injection. You may take meloxicam oral with or without food. Remove an orally disintegrating tablet from the package only when you are ready to take the medicine. Place the tablet in your mouth and allow it to dissolve, without chewing. Swallow several times as the tablet dissolves. Your dose needs may change if you switch to a different brand, strength, or form of this medicine. Avoid medication errors by using only the form and strength your doctor prescribes. Meloxicam doses are based on weight (especially in children and teenagers). Your dose needs may change if you gain or lose weight. If you use this medicine long-term, you may need frequent medical tests. Store meloxicam tablets or capsules at room temperature, away from moisture and heat. Keep the bottle tightly closed when not in use. What happens if I miss a dose?   Take the medicine as soon as you can, but skip the missed dose if it is almost time for your next dose. Do not take two doses at one time. What happens if I overdose? Seek emergency medical attention or call the Poison Help line at 1-616.134.3456. What should I avoid while taking meloxicam?  Avoid alcohol. Heavy drinking can increase your risk of stomach bleeding. Avoid taking aspirin while you are taking meloxicam, unless your doctor tells you to. Ask a doctor or pharmacist before using other medicines for pain, fever, swelling, or cold/flu symptoms. They may contain ingredients similar to meloxicam (such as aspirin, ibuprofen, ketoprofen, or naproxen). What are the possible side effects of meloxicam?  Get emergency medical help if you have signs of an allergic reaction (hives, difficult breathing, swelling in your face or throat) or a severe skin reaction (fever, sore throat, burning eyes, skin pain, red or purple skin rash with blistering and peeling). Get emergency medical help if you have signs of a heart attack or stroke: chest pain spreading to your jaw or shoulder, sudden numbness or weakness on one side of the body, slurred speech, leg swelling, feeling short of breath. Stop using meloxicam and call your doctor at once if you have:  · the first sign of any skin rash, no matter how mild;  · shortness of breath (even with mild exertion); · swelling or rapid weight gain;  · signs of stomach bleeding --bloody or tarry stools, coughing up blood or vomit that looks like coffee grounds;  · liver problems --nausea, upper stomach pain, itching, tired feeling, flu-like symptoms, loss of appetite, dark urine, tanya-colored stools, jaundice (yellowing of the skin or eyes);  · low red blood cells (anemia) --pale skin, unusual tiredness, feeling light-headed, cold hands and feet; or  · kidney problems --little or no urination, swelling in your feet or ankles, feeling tired or short of breath.   Common side effects may include:  · stomach pain, nausea, vomiting, heartburn;  · diarrhea, constipation, gas;  · dizziness; or  · cold symptoms, flu symptoms. This is not a complete list of side effects and others may occur. Call your doctor for medical advice about side effects. You may report side effects to FDA at 2-874-FDA-5843. What other drugs will affect meloxicam?  Ask your doctor before using meloxicam if you take an antidepressant. Taking certain antidepressants with an NSAID may cause you to bruise or bleed easily. Tell your doctor about all your other medicines, especially:  · cyclosporine;  · lithium;  · methotrexate;  · pemetrexed;  · sodium polystyrene sulfonate (Kayexalate);  · a blood thinner (warfarin, Coumadin, Jantoven);  · heart or blood pressure medication, including a diuretic or \"water pill\"; or  · steroid medicine (such as prednisone). This list is not complete. Other drugs may affect meloxicam, including prescription and over-the-counter medicines, vitamins, and herbal products. Not all possible drug interactions are listed here. Where can I get more information? Your pharmacist can provide more information about meloxicam.  Remember, keep this and all other medicines out of the reach of children, never share your medicines with others, and use this medication only for the indication prescribed. Every effort has been made to ensure that the information provided by [address] is accurate, up-to-date, and complete, but no guarantee is made to that effect. Drug information contained herein may be time sensitive. [facility] information has been compiled for use by healthcare practitioners and consumers in the [place] and therefore [facility] does not warrant that uses outside of the [place] are appropriate, unless specifically indicated otherwise. [facility]'s drug information does not endorse drugs, diagnose patients or recommend therapy.  [facility]'s drug information is an informational resource designed to assist licensed healthcare

## 2021-10-14 ENCOUNTER — OFFICE VISIT (OUTPATIENT)
Dept: SLEEP MEDICINE | Age: 31
End: 2021-10-14
Payer: COMMERCIAL

## 2021-10-14 VITALS
RESPIRATION RATE: 14 BRPM | OXYGEN SATURATION: 96 % | HEART RATE: 76 BPM | HEIGHT: 68 IN | WEIGHT: 276.7 LBS | SYSTOLIC BLOOD PRESSURE: 113 MMHG | BODY MASS INDEX: 41.94 KG/M2 | DIASTOLIC BLOOD PRESSURE: 75 MMHG

## 2021-10-14 DIAGNOSIS — G25.81 RLS (RESTLESS LEGS SYNDROME): ICD-10-CM

## 2021-10-14 DIAGNOSIS — G47.33 OBSTRUCTIVE SLEEP APNEA: Primary | ICD-10-CM

## 2021-10-14 DIAGNOSIS — G47.19 EXCESSIVE DAYTIME SLEEPINESS: ICD-10-CM

## 2021-10-14 DIAGNOSIS — E66.9 OBESITY, UNSPECIFIED CLASSIFICATION, UNSPECIFIED OBESITY TYPE, UNSPECIFIED WHETHER SERIOUS COMORBIDITY PRESENT: ICD-10-CM

## 2021-10-14 DIAGNOSIS — G47.00 INSOMNIA, UNSPECIFIED TYPE: ICD-10-CM

## 2021-10-14 PROCEDURE — 4004F PT TOBACCO SCREEN RCVD TLK: CPT | Performed by: STUDENT IN AN ORGANIZED HEALTH CARE EDUCATION/TRAINING PROGRAM

## 2021-10-14 PROCEDURE — 99214 OFFICE O/P EST MOD 30 MIN: CPT | Performed by: STUDENT IN AN ORGANIZED HEALTH CARE EDUCATION/TRAINING PROGRAM

## 2021-10-14 PROCEDURE — G8428 CUR MEDS NOT DOCUMENT: HCPCS | Performed by: STUDENT IN AN ORGANIZED HEALTH CARE EDUCATION/TRAINING PROGRAM

## 2021-10-14 PROCEDURE — G8484 FLU IMMUNIZE NO ADMIN: HCPCS | Performed by: STUDENT IN AN ORGANIZED HEALTH CARE EDUCATION/TRAINING PROGRAM

## 2021-10-14 PROCEDURE — G8417 CALC BMI ABV UP PARAM F/U: HCPCS | Performed by: STUDENT IN AN ORGANIZED HEALTH CARE EDUCATION/TRAINING PROGRAM

## 2021-10-14 ASSESSMENT — SLEEP AND FATIGUE QUESTIONNAIRES
ESS TOTAL SCORE: 18
HOW LIKELY ARE YOU TO NOD OFF OR FALL ASLEEP WHEN YOU ARE A PASSENGER IN A CAR FOR AN HOUR WITHOUT A BREAK: 3
HOW LIKELY ARE YOU TO NOD OFF OR FALL ASLEEP WHILE SITTING AND TALKING TO SOMEONE: 1
HOW LIKELY ARE YOU TO NOD OFF OR FALL ASLEEP WHILE WATCHING TV: 3
HOW LIKELY ARE YOU TO NOD OFF OR FALL ASLEEP WHILE LYING DOWN TO REST IN THE AFTERNOON WHEN CIRCUMSTANCES PERMIT: 3
HOW LIKELY ARE YOU TO NOD OFF OR FALL ASLEEP WHILE SITTING INACTIVE IN A PUBLIC PLACE: 2
HOW LIKELY ARE YOU TO NOD OFF OR FALL ASLEEP IN A CAR, WHILE STOPPED FOR A FEW MINUTES IN TRAFFIC: 0
HOW LIKELY ARE YOU TO NOD OFF OR FALL ASLEEP WHILE SITTING AND READING: 3
HOW LIKELY ARE YOU TO NOD OFF OR FALL ASLEEP WHILE SITTING QUIETLY AFTER LUNCH WITHOUT ALCOHOL: 3

## 2021-10-14 NOTE — PROGRESS NOTES
[facility] Sleep Medicine    Patient Name: Jessica Ward  Age: 26 y.o.   : 1990    Date of Visit: 10/14/21        Review of Last Visit Summary:    The patient was last seen on 2021 for  Obstructive Sleep Apnea and Obesity. Interim History:     Jessica Ward is a 26 y.o. male in office for follow up. He reports that he is sleeping less due to a new job and due to his . 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  He works till 8 at his new car job (9-8pm several days per week) and occasionally DJs till 3am (on )    Patient reports increased leak since his last visit. He does report that his mask is nearly 3year old. Benefits: More rested  DME: [facility]    Compliance download report reviewed today by me demonstrated the following:    Dates 2021 till 10/10/2021    Settings -8-16  Days used -27 out of 30  >4 hours-67%  AHI -0.5  Leak -15.9             Past Medical History:  Past Medical History:   Diagnosis Date    ADHD (attention deficit hyperactivity disorder)     Class 3 severe obesity due to excess calories without serious comorbidity with body mass index (BMI) of 40.0 to 44.9 in adult [facility]) 2019    Depression     Sleep apnea     Tobacco abuse 4/3/2019       Past Surgical History:        Procedure Laterality Date    FOOT SURGERY Left        Allergies:  has No Known Allergies.   Social History:    Social History     Tobacco Use    Smoking status: Current Some Day Smoker     Packs/day: 1.00     Years: 10.00     Pack years: 10.00     Types: Cigars    Smokeless tobacco: Never Used   Vaping Use    Vaping Use: Every day    Substances: Nicotine    Devices: Refillable tank   Substance Use Topics    Alcohol use: Yes     Comment: social    Drug use: No        Family History:       Problem Relation Age of Onset    Heart Disease Mother     Heart Attack Father     Heart Disease Father     No Known Problems Sister        Current Medications:    Current Outpatient Medications:     venlafaxine (EFFEXOR XR) 150 MG extended release capsule, Take 1 capsule by mouth daily, Disp: 30 capsule, Rfl: 5    [START ON 10/30/2021] amphetamine-dextroamphetamine (ADDERALL XR) 30 MG extended release capsule, Take 1 capsule by mouth daily for 30 days. , Disp: 30 capsule, Rfl: 0    amphetamine-dextroamphetamine (ADDERALL XR) 30 MG extended release capsule, Take 1 capsule by mouth every morning for 30 days. , Disp: 30 capsule, Rfl: 0    QUEtiapine (SEROQUEL XR) 50 MG extended release tablet, Take 1 tablet by mouth nightly, Disp: 30 tablet, Rfl: 5    meloxicam (MOBIC) 15 MG tablet, Take 1 tablet by mouth daily as needed for Pain, Disp: 30 tablet, Rfl: 1    venlafaxine (EFFEXOR XR) 75 MG extended release capsule, Take 225 mg daily, Disp: 30 capsule, Rfl: 5    pantoprazole (PROTONIX) 20 MG tablet, Take 1 tablet by mouth every morning (before breakfast), Disp: 30 tablet, Rfl: 5    acetaminophen (TYLENOL) 325 MG tablet, Take 650 mg by mouth every 6 hours as needed for Pain, Disp: , Rfl:     Multiple Vitamins-Minerals (THERAPEUTIC MULTIVITAMIN-MINERALS) tablet, Take 1 tablet by mouth daily, Disp: , Rfl:     amphetamine-dextroamphetamine (ADDERALL XR) 30 MG extended release capsule, Take 1 capsule by mouth every morning for 30 days. , Disp: 30 capsule, Rfl: 0          Objective:   PHYSICAL EXAM:    /75 (Site: Left Upper Arm, Position: Sitting, Cuff Size: Large Adult)   Pulse 76   Resp 14   Ht 5' 8\" (1.727 m)   Wt 276 lb 11.2 oz (125.5 kg)   SpO2 96%   BMI 42.07 kg/m²       (Sleep ROS above)     Constitutional: no chills, no fever   Eyes: no blurred vision and no eyesight problems. ENT: no hoarseness and no sore throat.    Cardiovascular: no chest pain,   Respiratory: no cough, no shortness of breath Gastrointestinal:  no nausea,  no vomiting, no diarrhea. Musculoskeletal: no arthralgias, no back pain and no myalgias. Integumentary: no rashes or lacerations. Neurological:  no diplopia, no dizziness,  no headache, no memory changes,  and no tingling. Endocrine: No chills      Physical exam:  Gen: No acute distress. BMI of Body mass index is 42.07 kg/m². Eyes: PERRL. No sclera icterus. No conjunctival injection. ENT: No nasal/oral discharge. Pharynx clear. Neck: Trachea midline. No obvious mass. Neck circumference     Resp: No accessory muscle use. No crackles. No wheezes. No rhonchi. CV: Regular rate. Regular rhythm. No murmur or rub. Skin: Warm and dry. No bleeding observed   M/S: No cyanosis. No obvious joint deformity. Neuro: Awake. Alert. Moves all four extremities. Psych: Alert and oriented. No anxiety. Sleep Medicine 10/14/2021 4/8/2021 11/17/2020 8/12/2020   Sitting and reading 3 1 1 -   Watching TV 3 3 3 -   Sitting, inactive in a public place (e.g. a theatre or a meeting) 2 1 3 -   As a passenger in a car for an hour without a break 3 1 1 -   Lying down to rest in the afternoon when circumstances permit 3 3 3 -   Sitting and talking to someone 1 0 0 -   Sitting quietly after a lunch without alcohol 3 1 2 -   In a car, while stopped for a few minutes in traffic 0 0 0 -   Total score 18 10 13 -   Neck circumference - 18 18 17.5       DATA:     Outside sleep study from [facility]. Date 6/14/2016. AHI 10.2. Mean SPO2 was 90%. Recommendation CPAP 9. Assessment:      Jacob Dumont was seen today for sleep apnea. Diagnoses and all orders for this visit:    Obstructive sleep apnea    Excessive daytime sleepiness    RLS (restless legs syndrome)    Obesity, unspecified classification, unspecified obesity type, unspecified whether serious comorbidity present    Insomnia, unspecified type    ·    Plan:       1. Obstructive Sleep Apnea.   - Continue current PAP settings    2. Obesity. Body mass index is 42.07 kg/m². Established with Dr. Sanchez Patient  -Healthy weight loss advised    3. Sleep Deprivation. Decreased sleep time due to new baby, work, and life stressors. Patient advised to try to sleep 7 to 8 hours nightly. 4.  Excessive daytime sleepiness. ESS 18. Patient switched jobs and is working more hours. Patient is currently on Adderall for ADHD, which helps his concentration. We will follow-up at next visit. 5.  Insomnia/Anxiety. Patient is established with Dr. Kelby Churchill.     6.  Restless leg syndrome. No Recent issues. education provided in handout. Will consider ferritin testing at future date if symptoms increase.          Follow up: Return in about 4 months (around 2/14/2022).

## 2021-10-14 NOTE — PATIENT INSTRUCTIONS
It was a pleasure seeing you today Makayla Portillo! Summary of Today's Visit      Please try to use your CPAP 7-8 hours nightly. Do not drive when sleepy    Please call our sleep nurses to schedule a follow up at - [phone] option 2              1. Continue using your machine nightly     ------------Please bring your machine/Data Card to every visit. -------------     -Request all data downloads be sent to my nurse        2. Contact your DME company about supplies or issues with your machine. As a general guideline, please replace your:  -CPAP mask every 3-6 months  -CPAP hose  every 3-6 months  -Filter every 2-4 weeks  -CPAP/BiPAP/ASV replacements every 5-7+ years     3. Continue healthy weight loss/stabilization, as this is recommended as a long-term intervention. 4. Please do not drive or operate machinery when you feel fatigued/sleepy/drowsy      5. Please try to sleep between 6-8 hours nightly with the CPAP mask    6. Please avoid sedatives, alcohol and caffeinated drinks at/near bed time. 7. Please call your supply (DME) company with any issues with your PAP device. Please call our office with any issues pertaining to the therapy.     ----Please note that complications of BLADE if not treated can increase risk for: systemic hypertension, pulmonary hypertension, cardiovascular morbidities (heart attack and stroke), car accidents and all cause mortality. For general questions or scheduling issues, call my nurse at [phone] option [id number] [facility], [address] [place], [place] [phone] option 2  f- [phone]           ----------------------------------------------------------  Common Issues and Solutions     Pillow is dislodging mask - Consider getting a CPAP pillow or switching to a mask with the hose at the top. Dry Mouth - Adjust Humidity and/or try Biotene Gel.   (Excessive leak can also cause this)     Leak - Please call your equipment provider  as they may need to adjust your mask. Difficulty tolerating the PAP mask - Contact your equipment company to try a different mask, we can order a \"mini sleep study\"with the sleep tech to try different masks/settings of pressure, also we have a sleep psychologist to help with anxiety related to wearing the PAP mask      ----------------------------------------------------------     For Questions and Concerns: In case of difficulty with your PAP device or mask interface, please FIRST contact your [address] [address] [address] (The company who provides you the CPAP/BiPAP/ASV machine/supplies). If you need the number for any other DME company, please call my Nurse at [phone] option 2     For questions concerning your [place] appointment: Call [phone] option 2  SLEEP LAB SCHEDULING:        ----------------------------------------------------------     Helpful Web Sites: For patients with ALL SLEEP DISORDERS: American Academy of Sleep Medicine http://sleepeducation. org; or GotaCopy: https://sleepfoundation. org  For patients with BLADE: American Sleep Apnea Association: http://erwin.org/. org  For patients with RLS: RLS Foundation: Silvio  For patients with INSOMNIA: https://www.joyce.org/. org/articles/sleep/insomnia-causes-and-cures. htm  For patients with DEPRESSION: Couple.com.Aruspex. PeopleMatter/depression            Learning About CPAP for Sleep Apnea  What is CPAP? CPAP/Bi-PAP is a small machine that you use at home every night while you sleep. It increases air pressure in your throat to keep your airway open. When you have sleep apnea, this can help you sleep better so you feel much better. CPAP stands for \"continuous positive airway pressure. \" Bi-PAP is a different PAP setting that allows for different pressures when you breathe in and out.    The CPAP/Bi-PAP machine will have one of the following:  · A mask that covers your nose and mouth  · Prongs that fit into your nose  · A mask that covers your nose only, the most common type. This type is called NCPAP. The N stands for \"nasal.\"  Why is it done? CPAP is a common/effective treatment for obstructive sleep apnea. How does it help? · CPAP can help you have more normal sleep, so you feel less sleepy and more alert during the daytime through the treatment of sleep apnea. · CPAP may help keep heart failure or other heart problems from getting worse. · CPAP may help lower your blood pressure. · If you use CPAP, your bed partner may also sleep better because you are snoring less. What are the side effects? Some people who use CPAP have:  · A dry or stuffy nose and a sore throat. · Irritated skin on the face. · Sore eyes. · Bloating. If you have any of these problems, work with your doctor to fix them. Here are some things you can try:  · Be sure the mask or nasal prongs fit well. · See if your doctor can adjust the pressure of your CPAP. · If your nose is dry, try a humidifier. If these things do not help, you might try a different type of machine. Some machines have air pressure that adjusts on its own. Others have air pressures that are different when you breathe in than when you breathe out. This may reduce discomfort caused by too much pressure in your nose. Where can you learn more? Go to https://Dajiealmitaeb.[employer]. org and sign in to your Haload account. Enter T244 in the [facility] box to learn more about \"Learning About CPAP for Sleep Apnea. \"     If you do not have an account, please click on the \"Sign Up Now\" link. Current as of: February 24, 2020               Content Version: 12.5  © 9873-7479 Healthwise, Incorporated. Care instructions adapted under license by [facility] ([facility]).  If you have questions about a medical condition or this instruction, always ask your healthcare professional. Marsha Santana disclaims any warranty or liability

## 2021-11-04 ENCOUNTER — TELEPHONE (OUTPATIENT)
Dept: FAMILY MEDICINE CLINIC | Age: 31
End: 2021-11-04

## 2021-11-04 DIAGNOSIS — F90.9 ATTENTION DEFICIT HYPERACTIVITY DISORDER (ADHD), UNSPECIFIED ADHD TYPE: ICD-10-CM

## 2021-11-04 NOTE — TELEPHONE ENCOUNTER
Patient called stating he lost his printed RX for Adderall XR 30 mg and is asking if Dr. Eugena Duane would provide him with a new one or send an E Script to [employer]. Informed patient that Dr. Eugena Duane is out of the ofc. I called [facility] and spoke w/Thais, pharmacy tech, who informed the last RX filled for Adderall was sent on 8/27/21 and picked up 8/29/21. Please advise.     Last seen 8/30/2021  Next appt 12/7/2021

## 2021-11-05 DIAGNOSIS — M25.562 ACUTE PAIN OF LEFT KNEE: ICD-10-CM

## 2021-11-05 DIAGNOSIS — M54.9 CHRONIC BILATERAL BACK PAIN, UNSPECIFIED BACK LOCATION: ICD-10-CM

## 2021-11-05 DIAGNOSIS — G89.29 CHRONIC BILATERAL BACK PAIN, UNSPECIFIED BACK LOCATION: ICD-10-CM

## 2021-11-08 RX ORDER — DEXTROAMPHETAMINE SACCHARATE, AMPHETAMINE ASPARTATE MONOHYDRATE, DEXTROAMPHETAMINE SULFATE AND AMPHETAMINE SULFATE 7.5; 7.5; 7.5; 7.5 MG/1; MG/1; MG/1; MG/1
30 CAPSULE, EXTENDED RELEASE ORAL DAILY
Qty: 30 CAPSULE | Refills: 0 | Status: SHIPPED | OUTPATIENT
Start: 2021-11-08 | End: 2021-12-07 | Stop reason: SDUPTHER

## 2021-11-09 ENCOUNTER — OFFICE VISIT (OUTPATIENT)
Dept: BARIATRICS/WEIGHT MGMT | Age: 31
End: 2021-11-09
Payer: COMMERCIAL

## 2021-11-09 VITALS
DIASTOLIC BLOOD PRESSURE: 67 MMHG | BODY MASS INDEX: 44.64 KG/M2 | WEIGHT: 284.4 LBS | HEIGHT: 67 IN | TEMPERATURE: 96.8 F | HEART RATE: 75 BPM | SYSTOLIC BLOOD PRESSURE: 116 MMHG

## 2021-11-09 DIAGNOSIS — G47.33 OBSTRUCTIVE SLEEP APNEA SYNDROME: Primary | ICD-10-CM

## 2021-11-09 DIAGNOSIS — E66.01 CLASS 3 SEVERE OBESITY DUE TO EXCESS CALORIES WITHOUT SERIOUS COMORBIDITY WITH BODY MASS INDEX (BMI) OF 40.0 TO 44.9 IN ADULT (HCC): ICD-10-CM

## 2021-11-09 PROCEDURE — G8484 FLU IMMUNIZE NO ADMIN: HCPCS | Performed by: INTERNAL MEDICINE

## 2021-11-09 PROCEDURE — 4004F PT TOBACCO SCREEN RCVD TLK: CPT | Performed by: INTERNAL MEDICINE

## 2021-11-09 PROCEDURE — G8428 CUR MEDS NOT DOCUMENT: HCPCS | Performed by: INTERNAL MEDICINE

## 2021-11-09 PROCEDURE — 99211 OFF/OP EST MAY X REQ PHY/QHP: CPT

## 2021-11-09 PROCEDURE — 99213 OFFICE O/P EST LOW 20 MIN: CPT | Performed by: INTERNAL MEDICINE

## 2021-11-09 PROCEDURE — G8417 CALC BMI ABV UP PARAM F/U: HCPCS | Performed by: INTERNAL MEDICINE

## 2021-11-09 RX ORDER — MELOXICAM 15 MG/1
TABLET ORAL
Qty: 30 TABLET | Refills: 1 | Status: SHIPPED
Start: 2021-11-09 | End: 2022-01-10

## 2021-11-09 NOTE — PROGRESS NOTES
CC -   BLADE, Obesity    Background -   Last visit:   8/03/21  First visit: 12/09/20     · BLADE  Diagnosed 2016  Moderate in severity  Using a CPAP  Daytime sleepiness: 4-9/10 (greatest when activity is low)  Daytime fatigue: 7-8/10  Restorative: 1/10    · Obesity  Began in childhood  Initial BMI 48.5, Wt 304.8 lbs  HS Grad wt 230 lbs  Lowest   wt 230 lbs  Highest  wt 310 lbs  Pattern of wt gain: grad until the past year  Wt change past yr: +30 lbs  Most wt lost: 10 lbs (stopped soda)  Other diets attempted: none    Desire to lose weight: 10/10  Problem posed by appetite: 6-7/10     Initial Diet:    Number of meals per day - 2-3    Number of snacks per day - 4-5    Meal volume - 12\" plate, sometimes seconds    Fast food/convenience store - 2-4x/week    Restaurants (not fast food) - 0-1x/week   Sweets - 7d/week (Keebler Fudge Stripe Cookies 8-10, lots of other sweet snacks)   Chips - 5-7d/week (Large portion of a large bag)   Crackers/pretzels - 0-1d/week   Nuts - 1-3d/week (5-7 handfuls)   Peanut Butter - 0-3d/week   Popcorn - 0d/week   Dried fruit - 0d/week   Whole fruit - 0d/week   Breakfast cereal - 0-2d/week (Cinnamon Toast Crunch)   Granola/Protein/Energy bar - 0d/week   Sugar sweetened beverages - 60-80 oz reg soda/wk, 64oz 2% milk/d, 4-7 cups coffee/wk, each with 3-4 tablespoons of sugar with 3-6 oz of flavored creamer in each    Protein - No supplements   Fiber - No supplements     Exercise:    Gym membership - Private gym in [place], but does not go    Walking - None    Running - None    Resistance - None    Aerobic class - None    ______________________    [facility] -  Past Medical History:   Diagnosis Date    ADHD (attention deficit hyperactivity disorder)     Class 3 severe obesity due to excess calories without serious comorbidity with body mass index (BMI) of 40.0 to 44.9 in adult [facility]) 4/30/2019    Depression     Sleep apnea     Tobacco abuse 4/3/2019     Current Outpatient Medications   Medication Sig Dispense Refill    amphetamine-dextroamphetamine (ADDERALL XR) 30 MG extended release capsule Take 1 capsule by mouth daily for 30 days. 30 capsule 0    venlafaxine (EFFEXOR XR) 150 MG extended release capsule Take 1 capsule by mouth daily 30 capsule 5    QUEtiapine (SEROQUEL XR) 50 MG extended release tablet Take 1 tablet by mouth nightly 30 tablet 5    meloxicam (MOBIC) 15 MG tablet Take 1 tablet by mouth daily as needed for Pain 30 tablet 1    venlafaxine (EFFEXOR XR) 75 MG extended release capsule Take 225 mg daily 30 capsule 5    pantoprazole (PROTONIX) 20 MG tablet Take 1 tablet by mouth every morning (before breakfast) 30 tablet 5    acetaminophen (TYLENOL) 325 MG tablet Take 650 mg by mouth every 6 hours as needed for Pain      Multiple Vitamins-Minerals (THERAPEUTIC MULTIVITAMIN-MINERALS) tablet Take 1 tablet by mouth daily       No current facility-administered medications for this visit. PE -  Gen : /67 (Site: Left Upper Arm, Position: Sitting, Cuff Size: Thigh)   Pulse 75   Temp 96.8 °F (36 °C) (Temporal)   Ht 5' 6.5\" (1.689 m)   Wt 284 lb 6.4 oz (129 kg)   BMI 45.22 kg/m²    WN, WD, NAD  Lung: Nml resp effort  Psych: Normal mood   Full affect  Neuro:  Moves all ext well  ______________________      HPI & A/P -   Problem 1  - BLADE  HPI   - See above Background for description      Compliant with CPAP      Daytime sleepiness for past week: 7-8/10 (was 3-4/10)       Excess weight is worsening his underlying airway obstruction  Assessment  - Uncontrolled  Plan   - Cont with nightly CPAP      Proceed with wt reduction per the plan below    Problem 2  - Obesity   HPI   - See above Background for description      Weight Date      304.8 lbs 12/09/20      300.0 lbs 02/15/21      290.8 lbs 03/29/21      284.6 lbs 05/13/21      280.4 lbs 08/03/21       284.4 lbs 11/09/21  Total weight loss to date: 10.4 lbs  DEN = 2650 gt/day = 18,550 gt/wk  Avg daily energy deficit:   12/09/20-02/15/21 = - 16,800/67d = 250 gt/day   02/15/21-03/29/21 = - 9.2 lbs/42d = 32,200cal/42d= - 770 gt/d deficit   03/29/21-05/13/21 = - 6.2 lbs/45d = 21,700cal/45d= - 482 gt/d deficit   05/13/21-08/03/21 = - 4.2 lbs/82d = 14,700cal/82d= - 179 gt/d deficit   08/03/21-11/09/21 = + 4.0 lbs (14,000 gt)/98d = + 143 gt/d excess         Weight impact of trouble foods =  Restaurant food 450 gt/wk + Sweets 4200 gt/wk + Chips 2400 gt/wk + Nuts 1800 gt/wk + Soda 840 gt/wk + Milk 6160 gt/wk + Coffee 1500 gt/wk = 17,350 gt/wk = 2,480 gt/day = 248 lbs/yr   His report of his trouble foods were an over-estimation   However, even if they are half of what he states, they still are one-third of his calorie needs. Therefore, eliminating them may produce a sufficient rate of weight loss. Will do this before implementing other interventions. Had success with using shakes as meal substitutes before, so may try this if elimination alone fails. We did not go with a counting-based regimen per his preference   We discussed wt loss drugs at the 5/13/21 and will not start because of possible side effect  Update:  Started a new job at [employer]  Has been without Adderall for a few weeks  SSB - none  Sweets - 4-5d/wk (amount per day is less than before the diet, but increased since last visit)  Salty snacks - eating more than 150 gt/d (smaller portions, but more freq during the day)  Restaurant food - 1-2x/wk  Milk average 8-10 oz/d (2% or skim)  Drinking DD latte on avg once every 2-3d  Exercise - none because of lack of time  Assessment  - Worsening;  does not want to count calories; needs to decrease his intake of HR foods  Plan   -   Patient Instructions   For 1 lb/5 days wt loss, calorie intake must be below 1800 gt/day  Walk 30 min/day  Establish 8 hours of food-free periods each day: 2 hours before bed time + 6 hours throughout the day, no period less than one hour and they must be the same every day.   Limit sweets to one day per month  Limit restaurant food to once every two weeks  Limit chips/nuts/crackers/pretzels to 150 gt/day (about two handfuls)  Stop all Sugar Sweetened Beverages  Limit milk to 8 oz of 2%/day  Stop all sugar and flavored creamer in coffee  See me in 8-10 weeks    Salvador Dubose MD  Endocrinology/Obesity  11/9/21

## 2021-11-09 NOTE — PATIENT INSTRUCTIONS
For 1 lb/5 days wt loss, calorie intake must be below 1800 gt/day  Walk 30 min/day  Establish 8 hours of food-free periods each day: 2 hours before bed time + 6 hours throughout the day, no period less than one hour and they must be the same every day.   Limit sweets to one day per month  Limit restaurant food to once every two weeks  Limit chips/nuts/crackers/pretzels to 150 gt/day (about two handfuls)  Stop all Sugar Sweetened Beverages  Limit milk to 8 oz of 2%/day  Stop all sugar and flavored creamer in coffee  See me in 8-10 weeks

## 2021-12-07 ENCOUNTER — OFFICE VISIT (OUTPATIENT)
Dept: FAMILY MEDICINE CLINIC | Age: 31
End: 2021-12-07
Payer: COMMERCIAL

## 2021-12-07 VITALS
DIASTOLIC BLOOD PRESSURE: 70 MMHG | OXYGEN SATURATION: 95 % | TEMPERATURE: 97 F | SYSTOLIC BLOOD PRESSURE: 130 MMHG | HEART RATE: 72 BPM | WEIGHT: 283 LBS | RESPIRATION RATE: 16 BRPM | HEIGHT: 68 IN | BODY MASS INDEX: 42.89 KG/M2

## 2021-12-07 DIAGNOSIS — F90.9 ATTENTION DEFICIT HYPERACTIVITY DISORDER (ADHD), UNSPECIFIED ADHD TYPE: Primary | ICD-10-CM

## 2021-12-07 DIAGNOSIS — F39 MOOD DISORDER (HCC): ICD-10-CM

## 2021-12-07 DIAGNOSIS — E03.8 SUBCLINICAL HYPOTHYROIDISM: ICD-10-CM

## 2021-12-07 DIAGNOSIS — R53.83 FATIGUE, UNSPECIFIED TYPE: ICD-10-CM

## 2021-12-07 DIAGNOSIS — F32.A MILD DEPRESSION: ICD-10-CM

## 2021-12-07 DIAGNOSIS — Z23 NEED FOR IMMUNIZATION AGAINST INFLUENZA: ICD-10-CM

## 2021-12-07 PROCEDURE — 90674 CCIIV4 VAC NO PRSV 0.5 ML IM: CPT | Performed by: FAMILY MEDICINE

## 2021-12-07 PROCEDURE — 4004F PT TOBACCO SCREEN RCVD TLK: CPT | Performed by: FAMILY MEDICINE

## 2021-12-07 PROCEDURE — G8428 CUR MEDS NOT DOCUMENT: HCPCS | Performed by: FAMILY MEDICINE

## 2021-12-07 PROCEDURE — G8417 CALC BMI ABV UP PARAM F/U: HCPCS | Performed by: FAMILY MEDICINE

## 2021-12-07 PROCEDURE — G8482 FLU IMMUNIZE ORDER/ADMIN: HCPCS | Performed by: FAMILY MEDICINE

## 2021-12-07 PROCEDURE — 99214 OFFICE O/P EST MOD 30 MIN: CPT | Performed by: FAMILY MEDICINE

## 2021-12-07 PROCEDURE — 90471 IMMUNIZATION ADMIN: CPT | Performed by: FAMILY MEDICINE

## 2021-12-07 RX ORDER — DEXTROAMPHETAMINE SACCHARATE, AMPHETAMINE ASPARTATE MONOHYDRATE, DEXTROAMPHETAMINE SULFATE AND AMPHETAMINE SULFATE 7.5; 7.5; 7.5; 7.5 MG/1; MG/1; MG/1; MG/1
30 CAPSULE, EXTENDED RELEASE ORAL DAILY
Qty: 30 CAPSULE | Refills: 0 | Status: SHIPPED | OUTPATIENT
Start: 2022-01-07 | End: 2022-03-09 | Stop reason: SDUPTHER

## 2021-12-07 RX ORDER — DEXTROAMPHETAMINE SACCHARATE, AMPHETAMINE ASPARTATE MONOHYDRATE, DEXTROAMPHETAMINE SULFATE AND AMPHETAMINE SULFATE 7.5; 7.5; 7.5; 7.5 MG/1; MG/1; MG/1; MG/1
30 CAPSULE, EXTENDED RELEASE ORAL DAILY
Qty: 30 CAPSULE | Refills: 0 | Status: SHIPPED | OUTPATIENT
Start: 2022-02-07 | End: 2022-03-09 | Stop reason: SDUPTHER

## 2021-12-07 RX ORDER — DEXTROAMPHETAMINE SACCHARATE, AMPHETAMINE ASPARTATE MONOHYDRATE, DEXTROAMPHETAMINE SULFATE AND AMPHETAMINE SULFATE 7.5; 7.5; 7.5; 7.5 MG/1; MG/1; MG/1; MG/1
30 CAPSULE, EXTENDED RELEASE ORAL DAILY
Qty: 30 CAPSULE | Refills: 0 | Status: SHIPPED | OUTPATIENT
Start: 2021-12-07 | End: 2022-03-09 | Stop reason: SDUPTHER

## 2021-12-07 SDOH — ECONOMIC STABILITY: FOOD INSECURITY: WITHIN THE PAST 12 MONTHS, THE FOOD YOU BOUGHT JUST DIDN'T LAST AND YOU DIDN'T HAVE MONEY TO GET MORE.: NEVER TRUE

## 2021-12-07 SDOH — ECONOMIC STABILITY: FOOD INSECURITY: WITHIN THE PAST 12 MONTHS, YOU WORRIED THAT YOUR FOOD WOULD RUN OUT BEFORE YOU GOT MONEY TO BUY MORE.: NEVER TRUE

## 2021-12-07 ASSESSMENT — ENCOUNTER SYMPTOMS
ABDOMINAL PAIN: 0
DIARRHEA: 0
COUGH: 0
CONSTIPATION: 0
NAUSEA: 0
VOMITING: 0
SHORTNESS OF BREATH: 0

## 2021-12-07 ASSESSMENT — SOCIAL DETERMINANTS OF HEALTH (SDOH): HOW HARD IS IT FOR YOU TO PAY FOR THE VERY BASICS LIKE FOOD, HOUSING, MEDICAL CARE, AND HEATING?: NOT HARD AT ALL

## 2021-12-07 NOTE — PROGRESS NOTES
2021     Ruth Sullivan (:  1990) is a 26 y.o. male, with a:  Past Medical History:   Diagnosis Date    ADHD (attention deficit hyperactivity disorder)     Class 3 severe obesity due to excess calories without serious comorbidity with body mass index (BMI) of 40.0 to 44.9 in adult [facility]) 2019    Depression     Sleep apnea     Tobacco abuse 4/3/2019       Here for evaluation of the following medical concerns:  Chief Complaint   Patient presents with    ADHD     He also follows with sleep medicine, endocrinology, behavioral health    F/U of chronic problem(s) and recent complaint of fatigue  Chronic problems reviewed today include:  ADHD, depression and mood disorder  Current status of this/these condition(s):  stable  Tolerating meds:         Yes    ADD/ADHD   Current treatment: Adderall XR, which has been effective. Recent medication changes: None  Medication side effects: none.      Depression / Anxiety / Mood Disorder  Current treatment: Venlafaxine 225 milligrams daily and Seroquel XR 50 mg nightly  Recent medication changes: none   Onset was approximately several months ago, exacerbated by social stressors  Previous treatment includes individual therapy and medication. Symptoms are stable      Review of Systems   Constitutional: Positive for fatigue. Negative for chills and fever. Respiratory: Negative for cough and shortness of breath. Cardiovascular: Negative for chest pain and leg swelling. Gastrointestinal: Negative for abdominal pain, constipation, diarrhea, nausea and vomiting. Genitourinary: Negative for dysuria. Neurological: Negative for headaches. Psychiatric/Behavioral: Positive for decreased concentration and dysphoric mood. The patient is not nervous/anxious. Prior to Visit Medications    Medication Sig Taking?  Authorizing Provider   amphetamine-dextroamphetamine (ADDERALL XR) 30 MG extended release capsule Take 1 capsule by mouth daily for 30 days. Yes Riaz Pierce,    amphetamine-dextroamphetamine (ADDERALL XR) 30 MG extended release capsule Take 1 capsule by mouth daily for 30 days. Yes Riaz Pierce DO   amphetamine-dextroamphetamine (ADDERALL XR) 30 MG extended release capsule Take 1 capsule by mouth daily for 30 days. Yes Riaz Pierce DO   meloxicam (MOBIC) 15 MG tablet take 1 tablet by mouth once daily AS NEEDED FOR PAIN Yes Riaz Pierce DO   venlafaxine (EFFEXOR XR) 150 MG extended release capsule Take 1 capsule by mouth daily Yes Riaz Pierce DO   QUEtiapine (SEROQUEL XR) 50 MG extended release tablet Take 1 tablet by mouth nightly Yes Riaz Pierce DO   venlafaxine (EFFEXOR XR) 75 MG extended release capsule Take 225 mg daily Yes Riaz iPerce DO   pantoprazole (PROTONIX) 20 MG tablet Take 1 tablet by mouth every morning (before breakfast) Yes Riaz Pierce DO   acetaminophen (TYLENOL) 325 MG tablet Take 650 mg by mouth every 6 hours as needed for Pain Yes Historical Provider, MD   Multiple Vitamins-Minerals (THERAPEUTIC MULTIVITAMIN-MINERALS) tablet Take 1 tablet by mouth daily Yes Historical Provider, MD        Social History     Tobacco Use    Smoking status: Current Some Day Smoker     Packs/day: 1.00     Years: 10.00     Pack years: 10.00     Types: Cigars    Smokeless tobacco: Never Used    Tobacco comment: Vape   Substance Use Topics    Alcohol use: Yes     Comment: social        Past Surgical History:   Procedure Laterality Date    FOOT SURGERY Left 2009       Vitals:    12/07/21 1038   BP: 130/70   Pulse: 72   Resp: 16   Temp: 97 °F (36.1 °C)   TempSrc: Temporal   SpO2: 95%   Weight: 283 lb (128.4 kg)   Height: 5' 8\" (1.727 m)     Estimated body mass index is 43.03 kg/m² as calculated from the following:    Height as of this encounter: 5' 8\" (1.727 m). Weight as of this encounter: 283 lb (128.4 kg). Physical Exam  Constitutional:       General: He is not in acute distress.      Appearance: He is well-developed. He is obese. HENT:      Head: Normocephalic and atraumatic. Right Ear: External ear normal.      Left Ear: External ear normal.   Eyes:      Extraocular Movements: Extraocular movements intact. Pupils: Pupils are equal, round, and reactive to light. Neck:      Thyroid: No thyromegaly. Cardiovascular:      Rate and Rhythm: Normal rate and regular rhythm. Pulmonary:      Effort: Pulmonary effort is normal. No respiratory distress. Breath sounds: Normal breath sounds. No wheezing or rales. Abdominal:      General: There is no distension. Palpations: Abdomen is soft. Tenderness: There is no abdominal tenderness. Musculoskeletal:         General: No swelling or deformity. Neurological:      General: No focal deficit present. Mental Status: He is alert. Mental status is at baseline. Psychiatric:         Mood and Affect: Mood normal.         Behavior: Behavior normal.         ASSESSMENT/PLAN:  Abiel Christianson was seen today for adhd. Diagnoses and all orders for this visit:    Attention deficit hyperactivity disorder (ADHD), unspecified ADHD type  -     amphetamine-dextroamphetamine (ADDERALL XR) 30 MG extended release capsule; Take 1 capsule by mouth daily for 30 days. -     amphetamine-dextroamphetamine (ADDERALL XR) 30 MG extended release capsule; Take 1 capsule by mouth daily for 30 days. -     amphetamine-dextroamphetamine (ADDERALL XR) 30 MG extended release capsule; Take 1 capsule by mouth daily for 30 days. Mild depression (HCC)    Mood disorder (HCC)    Subclinical hypothyroidism  -     TSH; Future  -     T4, FREE; Future  -     Comprehensive Metabolic Panel; Future  -     HEMOGLOBIN A1C; Future    Fatigue, unspecified type  -     TSH; Future  -     T4, FREE; Future  -     Comprehensive Metabolic Panel;  Future  -     Testosterone, free, total; Future  -     HEMOGLOBIN A1C; Future    BMI 40.0-44.9, adult ([facility] [address]    Need for immunization against influenza  -     INFLUENZA, MDCK QUADV, 2 YRS AND OLDER, IM, PF, PREFILL SYR OR SDV, 0.5ML (FLUCELVAX QUADV, PF)         Additional plan and future considerations:   As above. RTO in 3 months or sooner if needed    Controlled substances monitoring: no signs of potential drug abuse or diversion identified and OARRS report reviewed today- unavailable.       Future Appointments   Date Time Provider Tanika Link   12/14/2021 12:50 PM [facility] [place] [facility], COIVD-19 VACCINE SCHEDULE [place] [facility]   1/12/2022 10:00 AM Sandra Saenz MD Surg Weight [facility]   2/15/2022  9:00 AM Alex Bella DO [facility]   3/9/2022  9:15 [facility]         --Enrique Ramírez DO on 12/7/2021 at 11:12 AM

## 2022-01-08 DIAGNOSIS — M54.9 CHRONIC BILATERAL BACK PAIN, UNSPECIFIED BACK LOCATION: ICD-10-CM

## 2022-01-08 DIAGNOSIS — G89.29 CHRONIC BILATERAL BACK PAIN, UNSPECIFIED BACK LOCATION: ICD-10-CM

## 2022-01-08 DIAGNOSIS — M25.562 ACUTE PAIN OF LEFT KNEE: ICD-10-CM

## 2022-01-10 RX ORDER — MELOXICAM 15 MG/1
TABLET ORAL
Qty: 30 TABLET | Refills: 1 | Status: SHIPPED
Start: 2022-01-10 | End: 2022-03-21

## 2022-01-12 ENCOUNTER — OFFICE VISIT (OUTPATIENT)
Dept: BARIATRICS/WEIGHT MGMT | Age: 32
End: 2022-01-12
Payer: COMMERCIAL

## 2022-01-12 VITALS
SYSTOLIC BLOOD PRESSURE: 126 MMHG | HEART RATE: 86 BPM | HEIGHT: 67 IN | TEMPERATURE: 97.9 F | WEIGHT: 281.8 LBS | DIASTOLIC BLOOD PRESSURE: 73 MMHG | BODY MASS INDEX: 44.23 KG/M2

## 2022-01-12 DIAGNOSIS — E66.01 CLASS 3 SEVERE OBESITY DUE TO EXCESS CALORIES WITHOUT SERIOUS COMORBIDITY WITH BODY MASS INDEX (BMI) OF 40.0 TO 44.9 IN ADULT (HCC): ICD-10-CM

## 2022-01-12 DIAGNOSIS — G47.33 OBSTRUCTIVE SLEEP APNEA SYNDROME: Primary | ICD-10-CM

## 2022-01-12 PROCEDURE — G8417 CALC BMI ABV UP PARAM F/U: HCPCS | Performed by: INTERNAL MEDICINE

## 2022-01-12 PROCEDURE — 4004F PT TOBACCO SCREEN RCVD TLK: CPT | Performed by: INTERNAL MEDICINE

## 2022-01-12 PROCEDURE — 99211 OFF/OP EST MAY X REQ PHY/QHP: CPT

## 2022-01-12 PROCEDURE — 99213 OFFICE O/P EST LOW 20 MIN: CPT | Performed by: INTERNAL MEDICINE

## 2022-01-12 PROCEDURE — G8428 CUR MEDS NOT DOCUMENT: HCPCS | Performed by: INTERNAL MEDICINE

## 2022-01-12 PROCEDURE — G8482 FLU IMMUNIZE ORDER/ADMIN: HCPCS | Performed by: INTERNAL MEDICINE

## 2022-01-12 NOTE — PATIENT INSTRUCTIONS
For 1 lb/5 days wt loss, calorie intake must be below 1800 gt/day  Walk 30 min/day above the walking that occurs at the dealership  Establish 8 hours of food-free periods each day: 2 hours before bed time + 6 hours throughout the day, no period less than one hour and they must be the same every day.   Limit sweets to one day per month  Limit restaurant food to once every two weeks  Limit chips/nuts/crackers/pretzels to 150 gt/day (about two handfuls)  Stop all Sugar Sweetened Beverages  Limit milk to 8 oz of 2%/day  Stop all sugar and flavored creamer in coffee  See me in 8-10 weeks

## 2022-01-12 NOTE — PROGRESS NOTES
CC -   BLADE, Obesity    Background -   Last visit: 11/09/21  First visit: 12/09/20     · BLADE  Diagnosed 2016  Moderate in severity  Using a CPAP  Daytime sleepiness: 4-9/10 (greatest when activity is low)  Daytime fatigue: 7-8/10  Restorative: 1/10    · Obesity  Began in childhood  Initial BMI 48.5, Wt 304.8 lbs  HS Grad wt 230 lbs  Lowest   wt 230 lbs  Highest  wt 310 lbs  Pattern of wt gain: grad until the past year  Wt change past yr: +30 lbs  Most wt lost: 10 lbs (stopped soda)  Other diets attempted: none    Desire to lose weight: 10/10  Problem posed by appetite: 6-7/10     Initial Diet:    Number of meals per day - 2-3    Number of snacks per day - 4-5    Meal volume - 12\" plate, sometimes seconds    Fast food/convenience store - 2-4x/week    Restaurants (not fast food) - 0-1x/week   Sweets - 7d/week (Keebler Fudge Stripe Cookies 8-10, lots of other sweet snacks)   Chips - 5-7d/week (Large portion of a large bag)   Crackers/pretzels - 0-1d/week   Nuts - 1-3d/week (5-7 handfuls)   Peanut Butter - 0-3d/week   Popcorn - 0d/week   Dried fruit - 0d/week   Whole fruit - 0d/week   Breakfast cereal - 0-2d/week (Cinnamon Toast Crunch)   Granola/Protein/Energy bar - 0d/week   Sugar sweetened beverages - 60-80 oz reg soda/wk, 64oz 2% milk/d, 4-7 cups coffee/wk, each with 3-4 tablespoons of sugar with 3-6 oz of flavored creamer in each    Protein - No supplements   Fiber - No supplements     Exercise:    Gym membership - Private gym in [place], but does not go    Walking - None    Running - None    Resistance - None    Aerobic class - None    ______________________    [facility] -  Past Medical History:   Diagnosis Date    ADHD (attention deficit hyperactivity disorder)     Class 3 severe obesity due to excess calories without serious comorbidity with body mass index (BMI) of 40.0 to 44.9 in adult [facility]) 4/30/2019    Depression     Sleep apnea     Tobacco abuse 4/3/2019     Current Outpatient Medications   Medication Sig Dispense Refill    meloxicam (MOBIC) 15 MG tablet take 1 tablet by mouth once daily AS NEEDED FOR PAIN 30 tablet 1    amphetamine-dextroamphetamine (ADDERALL XR) 30 MG extended release capsule Take 1 capsule by mouth daily for 30 days. 30 capsule 0    amphetamine-dextroamphetamine (ADDERALL XR) 30 MG extended release capsule Take 1 capsule by mouth daily for 30 days. 30 capsule 0    [START ON 2/7/2022] amphetamine-dextroamphetamine (ADDERALL XR) 30 MG extended release capsule Take 1 capsule by mouth daily for 30 days. 30 capsule 0    venlafaxine (EFFEXOR XR) 150 MG extended release capsule Take 1 capsule by mouth daily 30 capsule 5    QUEtiapine (SEROQUEL XR) 50 MG extended release tablet Take 1 tablet by mouth nightly 30 tablet 5    venlafaxine (EFFEXOR XR) 75 MG extended release capsule Take 225 mg daily 30 capsule 5    pantoprazole (PROTONIX) 20 MG tablet Take 1 tablet by mouth every morning (before breakfast) 30 tablet 5    acetaminophen (TYLENOL) 325 MG tablet Take 650 mg by mouth every 6 hours as needed for Pain      Multiple Vitamins-Minerals (THERAPEUTIC MULTIVITAMIN-MINERALS) tablet Take 1 tablet by mouth daily       No current facility-administered medications for this visit. PE -  Gen : /73 (Site: Left Upper Arm, Position: Sitting, Cuff Size: Thigh)   Pulse 86   Temp 97.9 °F (36.6 °C) (Temporal)   Ht 5' 6.5\" (1.689 m)   Wt 281 lb 12.8 oz (127.8 kg)   BMI 44.80 kg/m²    WN, WD, NAD  Lung: Nml resp effort  Psych: Normal mood   Full affect  Neuro:  Moves all ext well  ______________________      HPI & A/P -   Problem 1  - BLADE  HPI   - See above Background for description      Compliant with CPAP 7d/wk, full duration of sleep      Daytime sleepiness for past week: 8/10 (was 7-8/10)      Excess weight is worsening his underlying airway obstruction  Assessment  - Uncontrolled  Plan   - Cont with nightly CPAP      Proceed with wt reduction per the plan below    Problem 2  - Obesity HPI   - See above Background for description      Weight Date      304.8 lbs 12/09/20      300.0 lbs 02/15/21      290.8 lbs 03/29/21      284.6 lbs 05/13/21      280.4 lbs 08/03/21       284.4 lbs 11/09/21      281.8 lbs 01/12/21  Total weight loss to date: 10.4 lbs  DEN = 2650 gt/day = 18,550 gt/wk  Avg daily energy deficit:   12/09/20-02/15/21 = - 16,800/67d = 250 gt/day   02/15/21-03/29/21 = - 9.2 lbs/42d = 32,200cal/42d= - 770 gt/d deficit   03/29/21-05/13/21 = - 6.2 lbs/45d = 21,700cal/45d= - 482 gt/d deficit   05/13/21-08/03/21 = - 4.2 lbs/82d = 14,700cal/82d= - 179 gt/d deficit   08/03/21-11/09/21 = + 4.0 lbs (14,000 gt)/98d = + 143 gt/d excess   11/09/21-01/12/22 = - 2.6 lbs  (  9,100 gt)/64d = - 142 gt/d deficit         Weight impact of trouble foods =  Restaurant food 450 gt/wk + Sweets 4200 gt/wk + Chips 2400 gt/wk + Nuts 1800 gt/wk + Soda 840 gt/wk + Milk 6160 gt/wk + Coffee 1500 gt/wk = 17,350 gt/wk = 2,480 gt/day = 248 lbs/yr   His report of his trouble foods were an over-estimation   However, even if they are half of what he states, they still are one-third of his calorie needs. Therefore, eliminating them may produce a sufficient rate of weight loss. Will do this before implementing other interventions. Had success with using shakes as meal substitutes before, so may try this if elimination alone fails.    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  SSB - very infreq (and small amounts when he does)  Sweets - 5d/wk (amount per day is less than before the diet,)  Salty snacks - eating more than 150 gt/d (smaller portions, but more freq during the day)  Restaurant food - 4-5x/wk  Milk average 8-10 oz/d (1%)  Drinking DD latte on avg once every 3-4d  Exercise - none because of lack of time  · Assessment  - Improving;  does not want to count calories; needs to decrease his intake of HR foods  · Plan   -   For 1 lb/5 days wt loss, calorie intake must be below 1800 gt/day  Walk 30 min/day [facility]  Establish 8 hours of food-free periods each day: 2 hours before bed time + 6 hours throughout the day, no period less than one hour and they must be the same every day.   Limit sweets to one day per month  Limit restaurant food to once every two weeks  Limit chips/nuts/crackers/pretzels to 150 gt/day (about two handfuls)  Stop all Sugar Sweetened Beverages  Limit milk to 8 oz of 2%/day  Stop all sugar and flavored creamer in coffee  See me in 8-10 weeks    Alisia Edmonds MD  Endocrinology/Obesity  1/12/22

## 2022-02-15 ENCOUNTER — OFFICE VISIT (OUTPATIENT)
Dept: SLEEP CENTER | Age: 32
End: 2022-02-15
Payer: COMMERCIAL

## 2022-02-15 VITALS
HEIGHT: 67 IN | BODY MASS INDEX: 44.67 KG/M2 | DIASTOLIC BLOOD PRESSURE: 80 MMHG | HEART RATE: 77 BPM | OXYGEN SATURATION: 97 % | RESPIRATION RATE: 14 BRPM | SYSTOLIC BLOOD PRESSURE: 113 MMHG | WEIGHT: 284.6 LBS

## 2022-02-15 DIAGNOSIS — Z72.820 SLEEP DEPRIVATION: ICD-10-CM

## 2022-02-15 DIAGNOSIS — G47.33 OBSTRUCTIVE SLEEP APNEA: Primary | ICD-10-CM

## 2022-02-15 DIAGNOSIS — F41.9 ANXIETY: ICD-10-CM

## 2022-02-15 DIAGNOSIS — E66.9 OBESITY, UNSPECIFIED CLASSIFICATION, UNSPECIFIED OBESITY TYPE, UNSPECIFIED WHETHER SERIOUS COMORBIDITY PRESENT: ICD-10-CM

## 2022-02-15 DIAGNOSIS — G25.81 RLS (RESTLESS LEGS SYNDROME): ICD-10-CM

## 2022-02-15 DIAGNOSIS — G47.19 EXCESSIVE DAYTIME SLEEPINESS: ICD-10-CM

## 2022-02-15 PROCEDURE — G8427 DOCREV CUR MEDS BY ELIG CLIN: HCPCS | Performed by: STUDENT IN AN ORGANIZED HEALTH CARE EDUCATION/TRAINING PROGRAM

## 2022-02-15 PROCEDURE — 4004F PT TOBACCO SCREEN RCVD TLK: CPT | Performed by: STUDENT IN AN ORGANIZED HEALTH CARE EDUCATION/TRAINING PROGRAM

## 2022-02-15 PROCEDURE — G8482 FLU IMMUNIZE ORDER/ADMIN: HCPCS | Performed by: STUDENT IN AN ORGANIZED HEALTH CARE EDUCATION/TRAINING PROGRAM

## 2022-02-15 PROCEDURE — 99214 OFFICE O/P EST MOD 30 MIN: CPT | Performed by: STUDENT IN AN ORGANIZED HEALTH CARE EDUCATION/TRAINING PROGRAM

## 2022-02-15 PROCEDURE — G8417 CALC BMI ABV UP PARAM F/U: HCPCS | Performed by: STUDENT IN AN ORGANIZED HEALTH CARE EDUCATION/TRAINING PROGRAM

## 2022-02-15 ASSESSMENT — SLEEP AND FATIGUE QUESTIONNAIRES
HOW LIKELY ARE YOU TO NOD OFF OR FALL ASLEEP IN A CAR, WHILE STOPPED FOR A FEW MINUTES IN TRAFFIC: 0
HOW LIKELY ARE YOU TO NOD OFF OR FALL ASLEEP WHILE SITTING AND TALKING TO SOMEONE: 1
HOW LIKELY ARE YOU TO NOD OFF OR FALL ASLEEP WHILE SITTING QUIETLY AFTER LUNCH WITHOUT ALCOHOL: 1
HOW LIKELY ARE YOU TO NOD OFF OR FALL ASLEEP WHILE SITTING INACTIVE IN A PUBLIC PLACE: 1
ESS TOTAL SCORE: 10
HOW LIKELY ARE YOU TO NOD OFF OR FALL ASLEEP WHILE LYING DOWN TO REST IN THE AFTERNOON WHEN CIRCUMSTANCES PERMIT: 3
HOW LIKELY ARE YOU TO NOD OFF OR FALL ASLEEP WHEN YOU ARE A PASSENGER IN A CAR FOR AN HOUR WITHOUT A BREAK: 1
HOW LIKELY ARE YOU TO NOD OFF OR FALL ASLEEP WHILE WATCHING TV: 2
HOW LIKELY ARE YOU TO NOD OFF OR FALL ASLEEP WHILE SITTING AND READING: 1

## 2022-02-15 NOTE — PROGRESS NOTES
[facility] Sleep Medicine    Patient Name: Carlos Venegas  Age: 33 y.o.   : 1990    Date of Visit: 2/15/22        Review of Last Visit Summary:    The patient was last seen on Visit date not found for  Obstructive Sleep Apnea and Obesity. Interim History:     Carlos Venegas is a 33 y.o. male in office for follow up. Patient is doing well. Patient is still on Adderall for ADHD    Benefits: Less sore throat, less tired, improved mood  DME: [facility]    Compliance download report reviewed today by me demonstrated the following:    Dates 22-22    Settings - APAP 8-16 cwp  Days used -   >4 hours-  87%  AHI - 0.6  Leak - 11  Avg usage - 7 hours 5 min          Past Medical History:  Past Medical History:   Diagnosis Date    ADHD (attention deficit hyperactivity disorder)     Class 3 severe obesity due to excess calories without serious comorbidity with body mass index (BMI) of 40.0 to 44.9 in adult [facility]) 2019    Depression     Sleep apnea     Tobacco abuse 4/3/2019       Past Surgical History:        Procedure Laterality Date    FOOT SURGERY Left        Allergies:  has No Known Allergies.   Social History:    Social History     Tobacco Use    Smoking status: Current Some Day Smoker     Packs/day: 1.00     Years: 10.00     Pack years: 10.00     Types: Cigars    Smokeless tobacco: Never Used    Tobacco comment: Vape   Vaping Use    Vaping Use: Every day    Substances: Nicotine    Devices: Refillable tank   Substance Use Topics    Alcohol use: Yes     Comment: social    Drug use: No        Family History:       Problem Relation Age of Onset    Heart Disease Mother     Heart Attack Father     Heart Disease Father     No Known Problems Sister        Current Medications:    Current Outpatient Medications:     meloxicam (MOBIC) 15 MG tablet, take 1 tablet by mouth once daily AS NEEDED FOR PAIN, Disp: 30 tablet, Rfl: 1    amphetamine-dextroamphetamine (ADDERALL XR) 30 MG extended release capsule, Take 1 capsule by mouth daily for 30 days. , Disp: 30 capsule, Rfl: 0    venlafaxine (EFFEXOR XR) 150 MG extended release capsule, Take 1 capsule by mouth daily, Disp: 30 capsule, Rfl: 5    QUEtiapine (SEROQUEL XR) 50 MG extended release tablet, Take 1 tablet by mouth nightly, Disp: 30 tablet, Rfl: 5    venlafaxine (EFFEXOR XR) 75 MG extended release capsule, Take 225 mg daily, Disp: 30 capsule, Rfl: 5    pantoprazole (PROTONIX) 20 MG tablet, Take 1 tablet by mouth every morning (before breakfast), Disp: 30 tablet, Rfl: 5    acetaminophen (TYLENOL) 325 MG tablet, Take 650 mg by mouth every 6 hours as needed for Pain, Disp: , Rfl:     Multiple Vitamins-Minerals (THERAPEUTIC MULTIVITAMIN-MINERALS) tablet, Take 1 tablet by mouth daily, Disp: , Rfl:     amphetamine-dextroamphetamine (ADDERALL XR) 30 MG extended release capsule, Take 1 capsule by mouth daily for 30 days. , Disp: 30 capsule, Rfl: 0    amphetamine-dextroamphetamine (ADDERALL XR) 30 MG extended release capsule, Take 1 capsule by mouth daily for 30 days. , Disp: 30 capsule, Rfl: 0          Objective:   PHYSICAL EXAM:    /80 (Site: Right Upper Arm, Position: Sitting, Cuff Size: Large Adult)   Pulse 77   Resp 14   Ht 5' 6.5\" (1.689 m)   Wt 284 lb 9.6 oz (129.1 kg)   SpO2 97%   BMI 45.25 kg/m²       (Sleep ROS above)     Constitutional: no chills, no fever   Eyes: no blurred vision and no eyesight problems. ENT: no hoarseness and no sore throat. Cardiovascular: no chest pain,   Respiratory: no cough, no shortness of breath   Gastrointestinal:  no nausea,  no vomiting, no diarrhea. Musculoskeletal: no arthralgias, no back pain and no myalgias. Integumentary: no rashes or lacerations. Neurological:  no diplopia, no dizziness,  no headache, no memory changes,  and no tingling. Endocrine: No chills      Physical exam:  Gen: No acute distress. BMI of Body mass index is 45.25 kg/m². Eyes: PERRL.  No sclera icterus. No conjunctival injection. ENT: No nasal/oral discharge. Pharynx clear. Neck: Trachea midline. No obvious mass. Neck circumference     Resp: No accessory muscle use. No crackles. No wheezes. No rhonchi. CV: Regular rate. Regular rhythm. No murmur or rub. Skin: Warm and dry. No bleeding observed   M/S: No cyanosis. No obvious joint deformity. Neuro: Awake. Alert. Moves all four extremities. Psych: Alert and oriented. No anxiety. Sleep Medicine 2/15/2022 10/14/2021 4/8/2021 11/17/2020 8/12/2020   Sitting and reading 1 3 1 1 -   Watching TV 2 3 3 3 -   Sitting, inactive in a public place (e.g. a theatre or a meeting) 1 2 1 3 -   As a passenger in a car for an hour without a break 1 3 1 1 -   Lying down to rest in the afternoon when circumstances permit 3 3 3 3 -   Sitting and talking to someone 1 1 0 0 -   Sitting quietly after a lunch without alcohol 1 3 1 2 -   In a car, while stopped for a few minutes in traffic 0 0 0 0 -   Total score 10 18 10 13 -   Neck circumference - - 18 18 17.5       DATA:     Outside sleep study from [facility]. Date 6/14/2016. AHI 10.2. Mean SPO2 was 90%. Recommendation CPAP 9. Assessment:      Carlee Hickman was seen today for sleep apnea. Diagnoses and all orders for this visit:    Obstructive sleep apnea  -     DME Order for CPAP as OP    Obesity, unspecified classification, unspecified obesity type, unspecified whether serious comorbidity present    Excessive daytime sleepiness    Anxiety    RLS (restless legs syndrome)    Sleep deprivation    ·    Plan:       1. Obstructive Sleep Apnea. - Continue current PAP settings    2. Obesity. Body mass index is 45.25 kg/m². -Established with Dr. Ihsan Garcia  -Healthy weight loss advised    3. Sleep Deprivation. PAP device notes >7 hours nightly.  Decreased sleep time previously due to new baby, work, and life stressors.    -Patient advised to try to sleep 7 to 8 hours nightly.     4.  Excessive daytime sleepiness.    -ESS 10.     -Patient is currently on Adderall for ADHD, which helps his concentration and decreased sleepiness.    -Follow-up at next visit.     5.  Insomnia/Anxiety.     -Denies SI/HI   -Patient is established with Dr. Christine Schulz. He will be establishing with a new psychologist, as his is leaving the practice.      6.  Restless leg syndrome.   -Rare issues.   -Education provided in handout.    - Will consider ferritin testing at future date if symptoms increase. Follow up: Return in about 6 months (around 8/15/2022) for Follow up BLADE.

## 2022-02-15 NOTE — PATIENT INSTRUCTIONS
It was a pleasure seeing you today Aren Recinos! Summary of Today's Visit      Please try to use your CPAP 7-8 hours nightly. Do not drive when sleepy    Please call our sleep nurses to schedule a follow up at - [phone] option 2              1. Continue using your machine nightly     ------------Please bring your machine/Data Card to every visit. -------------     -Request all data downloads be sent to my nurse        2. Contact your DME company about supplies or issues with your machine. As a general guideline, please replace your:  -CPAP mask every 3-6 months  -CPAP hose  every 3-6 months  -Filter every 2-4 weeks  -CPAP/BiPAP/ASV replacements every 5-7+ years     3. Continue healthy weight loss/stabilization, as this is recommended as a long-term intervention. 4. Please do not drive or operate machinery when you feel fatigued/sleepy/drowsy      5. Please try to sleep between 6-8 hours nightly with the CPAP mask    6. Please avoid sedatives, alcohol and caffeinated drinks at/near bed time. 7. Please call your supply (DME) company with any issues with your PAP device. Please call our office with any issues pertaining to the therapy.     ----Please note that complications of BLADE if not treated can increase risk for: systemic hypertension, pulmonary hypertension, cardiovascular morbidities (heart attack and stroke), car accidents and all cause mortality. For general questions or scheduling issues, call my nurse at [phone] option [id number] [phone] option 2  O- [phone]           ----------------------------------------------------------  Common Issues and Solutions     Pillow is dislodging mask - Consider getting a CPAP pillow or switching to a mask with the hose at the top. Dry Mouth - Adjust Humidity and/or try Biotene Gel.   (Excessive leak can also cause this)     Leak - Please call your equipment provider  as they may need to adjust your mask. Difficulty tolerating the PAP mask - Contact your equipment company to try a different mask, we can order a \"mini sleep study\"with the sleep tech to try different masks/settings of pressure, also we have a sleep psychologist to help with anxiety related to wearing the PAP mask      ----------------------------------------------------------     For Questions and Concerns: In case of difficulty with your PAP device or mask interface, please FIRST contact your [address] [phone] [address] (The company who provides you the CPAP/BiPAP/ASV machine/supplies). If you need the number for any other DME company, please call my Nurse at [phone] option 2     For questions concerning your [place] appointment: Call [phone] option 2  SLEEP LAB SCHEDULING:        ----------------------------------------------------------     Helpful Web Sites: For patients with ALL SLEEP DISORDERS: American Academy of Sleep Medicine http://sleepeducation. org; or CompareAway: https://sleepfoundation. org  For patients with BLADE: American Sleep Apnea Association: http://erwin.org/. org  For patients with RLS: RLS Foundation: Silvio  For patients with INSOMNIA: https://www.joyce.org/. org/articles/sleep/insomnia-causes-and-cures. htm  For patients with DEPRESSION: Medivo.com.DraftDay. Yakaz/depression            Learning About CPAP for Sleep Apnea  What is CPAP? CPAP/Bi-PAP is a small machine that you use at home every night while you sleep. It increases air pressure in your throat to keep your airway open. When you have sleep apnea, this can help you sleep better so you feel much better. CPAP stands for \"continuous positive airway pressure. \" Bi-PAP is a different PAP setting that allows for different pressures when you breathe in and out.    The CPAP/Bi-PAP machine will have one of the following:  · A mask that covers your nose and mouth  · Prongs that fit into your nose  · A mask that covers your nose only, the most common type. This type is called NCPAP. The N stands for \"nasal.\"  Why is it done? CPAP is a common/effective treatment for obstructive sleep apnea. How does it help? · CPAP can help you have more normal sleep, so you feel less sleepy and more alert during the daytime through the treatment of sleep apnea. · CPAP may help keep heart failure or other heart problems from getting worse. · CPAP may help lower your blood pressure. · If you use CPAP, your bed partner may also sleep better because you are snoring less. What are the side effects? Some people who use CPAP have:  · A dry or stuffy nose and a sore throat. · Irritated skin on the face. · Sore eyes. · Bloating. If you have any of these problems, work with your doctor to fix them. Here are some things you can try:  · Be sure the mask or nasal prongs fit well. · See if your doctor can adjust the pressure of your CPAP. · If your nose is dry, try a humidifier. If these things do not help, you might try a different type of machine. Some machines have air pressure that adjusts on its own. Others have air pressures that are different when you breathe in than when you breathe out. This may reduce discomfort caused by too much pressure in your nose. Where can you learn more? Go to https://Faradaypeyolandaeb.[employer]. org and sign in to your Kaliki account. Enter I912 in the [facility] box to learn more about \"Learning About CPAP for Sleep Apnea. \"     If you do not have an account, please click on the \"Sign Up Now\" link. Current as of: February 24, 2020               Content Version: 12.5  © 2888-6130 Healthwise, Incorporated. Care instructions adapted under license by [facility] ([facility]).  If you have questions about a medical condition or this instruction, always ask your healthcare professional. Jaylin Chavez disclaims any warranty or liability for your use of this information. The general categories for the treatment of Sleep Apnea (including benefits and potential side effects) include:     1. Supportive Care  -Elevate the head of the bed   -Avoid sleeping on your back      2. Weight loss  -Start a healthy weight loss program  -Consider a referral to Weight Loss Medicine Clinic  -Benefits: Multiple health benefits  -Side Effects: Depends on treatment type     3. Oral Appliance \"Mouth Piece\"  (Mandibular Advancement Device)  -Benefits: CPAP alternative. Decreased snoring, improved sleep quality, decreased sleepiness.  -Side Effects: teeth crowding, oral/dental pain. Regular visits with dentist are advised to watch for side effects. 4. Positive pressure therapy with a machine and a mask (CPAP or BiPAP)  Benefits: Decreased snoring, improved sleep quality, decreased sleepiness. Risks: Aerophagia (swallowing air), dry nose/nose bleeds, dry eyes (due to leak), skin sores/rash (due to mask)     5. Different kinds of surgeries, including nasal surgery, surgery of the throat/windpipe, and nerve stimulation therapy (INSPIRE). Benefits: CPAP alternative. Decreased snoring, improved sleep quality, decreased sleepiness.   Risks: Bleeding, infection, nerve damage    Restless Leg Syndrome     Avoid  - Caffeinated beverages  - Alcohol  - Tobacco     Consider  -Massage  -Excercise  -Stretching  -Warm Baths  -A Relaxis Pad (Non-Pharmaceutical treatment for RLS)        Try  -Maintaining a good sleep schedule and good sleep hygiene  -Massaging the area of discomfort     Learn More at:  Aureliant

## 2022-03-09 ENCOUNTER — OFFICE VISIT (OUTPATIENT)
Dept: FAMILY MEDICINE CLINIC | Age: 32
End: 2022-03-09
Payer: COMMERCIAL

## 2022-03-09 ENCOUNTER — OFFICE VISIT (OUTPATIENT)
Dept: BARIATRICS/WEIGHT MGMT | Age: 32
End: 2022-03-09
Payer: COMMERCIAL

## 2022-03-09 VITALS
HEART RATE: 86 BPM | TEMPERATURE: 97.8 F | HEIGHT: 67 IN | DIASTOLIC BLOOD PRESSURE: 80 MMHG | BODY MASS INDEX: 43.63 KG/M2 | SYSTOLIC BLOOD PRESSURE: 130 MMHG | WEIGHT: 278 LBS | OXYGEN SATURATION: 97 % | RESPIRATION RATE: 16 BRPM

## 2022-03-09 VITALS
HEIGHT: 67 IN | WEIGHT: 277.6 LBS | BODY MASS INDEX: 43.57 KG/M2 | HEART RATE: 90 BPM | DIASTOLIC BLOOD PRESSURE: 83 MMHG | TEMPERATURE: 97.2 F | SYSTOLIC BLOOD PRESSURE: 121 MMHG

## 2022-03-09 DIAGNOSIS — F41.9 ANXIETY: ICD-10-CM

## 2022-03-09 DIAGNOSIS — F90.9 ATTENTION DEFICIT HYPERACTIVITY DISORDER (ADHD), UNSPECIFIED ADHD TYPE: Primary | ICD-10-CM

## 2022-03-09 DIAGNOSIS — F32.A MILD DEPRESSION: ICD-10-CM

## 2022-03-09 DIAGNOSIS — F39 MOOD DISORDER (HCC): ICD-10-CM

## 2022-03-09 DIAGNOSIS — G47.33 OBSTRUCTIVE SLEEP APNEA SYNDROME: Primary | ICD-10-CM

## 2022-03-09 DIAGNOSIS — K21.9 GASTROESOPHAGEAL REFLUX DISEASE, UNSPECIFIED WHETHER ESOPHAGITIS PRESENT: ICD-10-CM

## 2022-03-09 DIAGNOSIS — R79.89 ELEVATED LFTS: ICD-10-CM

## 2022-03-09 DIAGNOSIS — E03.8 SUBCLINICAL HYPOTHYROIDISM: ICD-10-CM

## 2022-03-09 DIAGNOSIS — E66.01 CLASS 3 SEVERE OBESITY DUE TO EXCESS CALORIES WITHOUT SERIOUS COMORBIDITY WITH BODY MASS INDEX (BMI) OF 40.0 TO 44.9 IN ADULT (HCC): ICD-10-CM

## 2022-03-09 PROCEDURE — 99214 OFFICE O/P EST MOD 30 MIN: CPT | Performed by: FAMILY MEDICINE

## 2022-03-09 PROCEDURE — G8428 CUR MEDS NOT DOCUMENT: HCPCS | Performed by: INTERNAL MEDICINE

## 2022-03-09 PROCEDURE — G8482 FLU IMMUNIZE ORDER/ADMIN: HCPCS | Performed by: INTERNAL MEDICINE

## 2022-03-09 PROCEDURE — 4004F PT TOBACCO SCREEN RCVD TLK: CPT | Performed by: FAMILY MEDICINE

## 2022-03-09 PROCEDURE — 4004F PT TOBACCO SCREEN RCVD TLK: CPT | Performed by: INTERNAL MEDICINE

## 2022-03-09 PROCEDURE — 99213 OFFICE O/P EST LOW 20 MIN: CPT | Performed by: INTERNAL MEDICINE

## 2022-03-09 PROCEDURE — G8417 CALC BMI ABV UP PARAM F/U: HCPCS | Performed by: FAMILY MEDICINE

## 2022-03-09 PROCEDURE — G8482 FLU IMMUNIZE ORDER/ADMIN: HCPCS | Performed by: FAMILY MEDICINE

## 2022-03-09 PROCEDURE — 99211 OFF/OP EST MAY X REQ PHY/QHP: CPT

## 2022-03-09 PROCEDURE — G8417 CALC BMI ABV UP PARAM F/U: HCPCS | Performed by: INTERNAL MEDICINE

## 2022-03-09 PROCEDURE — G8427 DOCREV CUR MEDS BY ELIG CLIN: HCPCS | Performed by: FAMILY MEDICINE

## 2022-03-09 RX ORDER — PANTOPRAZOLE SODIUM 20 MG/1
20 TABLET, DELAYED RELEASE ORAL DAILY PRN
Qty: 30 TABLET | Refills: 5 | Status: SHIPPED | OUTPATIENT
Start: 2022-03-09

## 2022-03-09 RX ORDER — VENLAFAXINE HYDROCHLORIDE 75 MG/1
CAPSULE, EXTENDED RELEASE ORAL
Qty: 30 CAPSULE | Refills: 5 | Status: SHIPPED
Start: 2022-03-09 | End: 2022-06-08 | Stop reason: SDUPTHER

## 2022-03-09 RX ORDER — VENLAFAXINE HYDROCHLORIDE 150 MG/1
150 CAPSULE, EXTENDED RELEASE ORAL DAILY
Qty: 30 CAPSULE | Refills: 5 | Status: SHIPPED
Start: 2022-03-09 | End: 2022-06-08 | Stop reason: SDUPTHER

## 2022-03-09 RX ORDER — DEXTROAMPHETAMINE SACCHARATE, AMPHETAMINE ASPARTATE MONOHYDRATE, DEXTROAMPHETAMINE SULFATE AND AMPHETAMINE SULFATE 7.5; 7.5; 7.5; 7.5 MG/1; MG/1; MG/1; MG/1
30 CAPSULE, EXTENDED RELEASE ORAL DAILY
Qty: 30 CAPSULE | Refills: 0 | Status: SHIPPED | OUTPATIENT
Start: 2022-03-09 | End: 2022-09-08

## 2022-03-09 RX ORDER — DEXTROAMPHETAMINE SACCHARATE, AMPHETAMINE ASPARTATE MONOHYDRATE, DEXTROAMPHETAMINE SULFATE AND AMPHETAMINE SULFATE 7.5; 7.5; 7.5; 7.5 MG/1; MG/1; MG/1; MG/1
30 CAPSULE, EXTENDED RELEASE ORAL DAILY
Qty: 30 CAPSULE | Refills: 0 | Status: SHIPPED | OUTPATIENT
Start: 2022-04-09 | End: 2022-09-08 | Stop reason: SDUPTHER

## 2022-03-09 RX ORDER — QUETIAPINE FUMARATE 50 MG/1
50 TABLET, EXTENDED RELEASE ORAL NIGHTLY
Qty: 30 TABLET | Refills: 5 | Status: SHIPPED
Start: 2022-03-09 | End: 2022-06-08 | Stop reason: SDUPTHER

## 2022-03-09 RX ORDER — DEXTROAMPHETAMINE SACCHARATE, AMPHETAMINE ASPARTATE MONOHYDRATE, DEXTROAMPHETAMINE SULFATE AND AMPHETAMINE SULFATE 7.5; 7.5; 7.5; 7.5 MG/1; MG/1; MG/1; MG/1
30 CAPSULE, EXTENDED RELEASE ORAL DAILY
Qty: 30 CAPSULE | Refills: 0 | Status: SHIPPED | OUTPATIENT
Start: 2022-05-09 | End: 2022-06-08 | Stop reason: SDUPTHER

## 2022-03-09 ASSESSMENT — PATIENT HEALTH QUESTIONNAIRE - PHQ9
SUM OF ALL RESPONSES TO PHQ QUESTIONS 1-9: 9
4. FEELING TIRED OR HAVING LITTLE ENERGY: 3
3. TROUBLE FALLING OR STAYING ASLEEP: 0
SUM OF ALL RESPONSES TO PHQ9 QUESTIONS 1 & 2: 1
7. TROUBLE CONCENTRATING ON THINGS, SUCH AS READING THE NEWSPAPER OR WATCHING TELEVISION: 1
6. FEELING BAD ABOUT YOURSELF - OR THAT YOU ARE A FAILURE OR HAVE LET YOURSELF OR YOUR FAMILY DOWN: 1
2. FEELING DOWN, DEPRESSED OR HOPELESS: 0
10. IF YOU CHECKED OFF ANY PROBLEMS, HOW DIFFICULT HAVE THESE PROBLEMS MADE IT FOR YOU TO DO YOUR WORK, TAKE CARE OF THINGS AT HOME, OR GET ALONG WITH OTHER PEOPLE: 3
8. MOVING OR SPEAKING SO SLOWLY THAT OTHER PEOPLE COULD HAVE NOTICED. OR THE OPPOSITE, BEING SO FIGETY OR RESTLESS THAT YOU HAVE BEEN MOVING AROUND A LOT MORE THAN USUAL: 0
SUM OF ALL RESPONSES TO PHQ QUESTIONS 1-9: 9
SUM OF ALL RESPONSES TO PHQ QUESTIONS 1-9: 9
9. THOUGHTS THAT YOU WOULD BE BETTER OFF DEAD, OR OF HURTING YOURSELF: 0
5. POOR APPETITE OR OVEREATING: 3
1. LITTLE INTEREST OR PLEASURE IN DOING THINGS: 1
SUM OF ALL RESPONSES TO PHQ QUESTIONS 1-9: 9

## 2022-03-09 ASSESSMENT — ENCOUNTER SYMPTOMS
COUGH: 0
CONSTIPATION: 0
SHORTNESS OF BREATH: 0
DIARRHEA: 0
ABDOMINAL PAIN: 0
VOMITING: 0
NAUSEA: 0

## 2022-03-09 NOTE — PROGRESS NOTES
3/9/2022     Carlos Venegas (:  1990) is a 33 y.o. male, with a:  Past Medical History:   Diagnosis Date    ADHD (attention deficit hyperactivity disorder)     Class 3 severe obesity due to excess calories without serious comorbidity with body mass index (BMI) of 40.0 to 44.9 in adult [facility]) 2019    Depression     Sleep apnea     Tobacco abuse 4/3/2019       Here for evaluation of the following medical concerns:  Chief Complaint   Patient presents with    ADHD     didnt complete labs      He also follows with sleep medicine, endocrinology, behavioral health    Down 5 lbs since last OV    F/U of chronic problem(s)   Chronic problems reviewed today include:  ADHD, depression and mood disorder  Current status of this/these condition(s):  stable  Tolerating meds:         Yes    ADD/ADHD   Current treatment: Adderall XR 30 mg, which has been effective. Recent medication changes: None  Medication side effects: none     Depression / Anxiety / Mood Disorder  Current treatment: Venlafaxine 225 milligrams daily and Seroquel XR 50 mg nightly  Recent medication changes: none   Onset was approximately several months ago, exacerbated by social stressors  Previous treatment includes individual therapy and medication. Symptoms are stable    GERD  Current treatment: Pantoprazole 20 mg daily  Recent medication changes: None  Taking intermittently    Review of Systems   Constitutional: Positive for fatigue. Negative for chills and fever. Respiratory: Negative for cough and shortness of breath. Cardiovascular: Negative for chest pain and leg swelling. Gastrointestinal: Negative for abdominal pain, constipation, diarrhea, nausea and vomiting. Genitourinary: Negative for dysuria. Neurological: Negative for headaches. Psychiatric/Behavioral: Positive for decreased concentration and dysphoric mood. The patient is not nervous/anxious. Prior to Visit Medications    Medication Sig Taking? Authorizing Provider   amphetamine-dextroamphetamine (ADDERALL XR) 30 MG extended release capsule Take 1 capsule by mouth daily for 30 days. Yes Riaz Pierce, DO   amphetamine-dextroamphetamine (ADDERALL XR) 30 MG extended release capsule Take 1 capsule by mouth daily for 30 days. Yes Riaz Pierce, DO   amphetamine-dextroamphetamine (ADDERALL XR) 30 MG extended release capsule Take 1 capsule by mouth daily for 30 days. Yes Riaz Pierce DO   pantoprazole (PROTONIX) 20 MG tablet Take 1 tablet by mouth daily as needed (heartburn) Yes Riaz Pierce DO   venlafaxine (EFFEXOR XR) 75 MG extended release capsule Take 225 mg daily Yes Riaz Pierce DO   venlafaxine (EFFEXOR XR) 150 MG extended release capsule Take 1 capsule by mouth daily Yes Riaz Pierce DO   QUEtiapine (SEROQUEL XR) 50 MG extended release tablet Take 1 tablet by mouth nightly Yes Riaz Pierce DO   meloxicam (MOBIC) 15 MG tablet take 1 tablet by mouth once daily AS NEEDED FOR PAIN Yes Riaz Pierce DO   acetaminophen (TYLENOL) 325 MG tablet Take 650 mg by mouth every 6 hours as needed for Pain Yes Historical Provider, MD   Multiple Vitamins-Minerals (THERAPEUTIC MULTIVITAMIN-MINERALS) tablet Take 1 tablet by mouth daily Yes Historical Provider, MD        Social History     Tobacco Use    Smoking status: Current Some Day Smoker     Packs/day: 1.00     Years: 10.00     Pack years: 10.00     Types: Cigars    Smokeless tobacco: Never Used    Tobacco comment: Vape   Substance Use Topics    Alcohol use: Yes     Comment: social        Past Surgical History:   Procedure Laterality Date    FOOT SURGERY Left 2009       Vitals:    03/09/22 0922   BP: 130/80   Pulse: 86   Resp: 16   Temp: 97.8 °F (36.6 °C)   TempSrc: Temporal   SpO2: 97%   Weight: 278 lb (126.1 kg)   Height: 5' 6.5\" (1.689 m)     Estimated body mass index is 44.2 kg/m² as calculated from the following:    Height as of this encounter: 5' 6.5\" (1.689 m).     Weight as of this encounter: 278 lb (126.1 kg). Physical Exam  Constitutional:       General: He is not in acute distress. Appearance: He is well-developed. He is obese. HENT:      Head: Normocephalic and atraumatic. Right Ear: External ear normal.      Left Ear: External ear normal.   Eyes:      Extraocular Movements: Extraocular movements intact. Pupils: Pupils are equal, round, and reactive to light. Neck:      Thyroid: No thyromegaly. Cardiovascular:      Rate and Rhythm: Normal rate and regular rhythm. Pulmonary:      Effort: Pulmonary effort is normal. No respiratory distress. Breath sounds: Normal breath sounds. No wheezing or rales. Abdominal:      General: There is no distension. Musculoskeletal:         General: No swelling or deformity. Neurological:      General: No focal deficit present. Mental Status: He is alert. Mental status is at baseline. Psychiatric:         Mood and Affect: Mood normal.         Behavior: Behavior normal.         ASSESSMENT/PLAN:  Homero Palmer was seen today for adhd. Diagnoses and all orders for this visit:    Attention deficit hyperactivity disorder (ADHD), unspecified ADHD type  -     amphetamine-dextroamphetamine (ADDERALL XR) 30 MG extended release capsule; Take 1 capsule by mouth daily for 30 days. -     amphetamine-dextroamphetamine (ADDERALL XR) 30 MG extended release capsule; Take 1 capsule by mouth daily for 30 days. -     amphetamine-dextroamphetamine (ADDERALL XR) 30 MG extended release capsule; Take 1 capsule by mouth daily for 30 days. Mild depression (HCC)  -     venlafaxine (EFFEXOR XR) 75 MG extended release capsule; Take 225 mg daily  -     venlafaxine (EFFEXOR XR) 150 MG extended release capsule; Take 1 capsule by mouth daily  -     QUEtiapine (SEROQUEL XR) 50 MG extended release tablet; Take 1 tablet by mouth nightly    Mood disorder (HCC)  -     QUEtiapine (SEROQUEL XR) 50 MG extended release tablet;  Take 1 tablet by mouth nightly    Anxiety  -     venlafaxine (EFFEXOR XR) 75 MG extended release capsule; Take 225 mg daily  -     venlafaxine (EFFEXOR XR) 150 MG extended release capsule; Take 1 capsule by mouth daily    Gastroesophageal reflux disease, unspecified whether esophagitis present  -     pantoprazole (PROTONIX) 20 MG tablet; Take 1 tablet by mouth daily as needed (heartburn)    Subclinical hypothyroidism  -     Hemoglobin A1C; Future  -     Comprehensive Metabolic Panel; Future  -     TSH; Future  -     T4, Free; Future    Elevated LFTs  -     Hemoglobin A1C; Future  -     Comprehensive Metabolic Panel; Future  -     TSH; Future  -     T4, Free; Future         Additional plan and future considerations:   As above. RTO in 3 months or sooner if needed    Controlled substances monitoring: no signs of potential drug abuse or diversion identified and [place]ARRS report reviewed today- unavailable.       Future Appointments   Date Time Provider Tanika Link   3/9/2022 10:30 AM Claudio Mayes MD [facility]   8/16/2022  9:40 AM Selina Holm DO [facility] SLEEP [facility]         --Shan Bowman DO on 3/9/2022 at 10:17 AM

## 2022-03-09 NOTE — PROGRESS NOTES
CC -   BLADE, Obesity    Background -   Last visit:  01/12/22  First visit: 12/09/20     · BLADE  Diagnosed 2016  Moderate in severity  Using a CPAP  Daytime sleepiness: 4-9/10 (greatest when activity is low)  Daytime fatigue: 7-8/10  Restorative: 1/10    · Obesity  Began in childhood  Initial BMI 48.5, Wt 304.8 lbs  HS Grad wt 230 lbs  Lowest   wt 230 lbs  Highest  wt 310 lbs  Pattern of wt gain: grad until the past year  Wt change past yr: +30 lbs  Most wt lost: 10 lbs (stopped soda)  Other diets attempted: none    Desire to lose weight: 10/10  Problem posed by appetite: 6-7/10     Initial Diet:    Number of meals per day - 2-3    Number of snacks per day - 4-5    Meal volume - 12\" plate, sometimes seconds    Fast food/convenience store - 2-4x/week    Restaurants (not fast food) - 0-1x/week   Sweets - 7d/week (Keebler Fudge Stripe Cookies 8-10, lots of other sweet snacks)   Chips - 5-7d/week (Large portion of a large bag)   Crackers/pretzels - 0-1d/week   Nuts - 1-3d/week (5-7 handfuls)   Peanut Butter - 0-3d/week   Popcorn - 0d/week   Dried fruit - 0d/week   Whole fruit - 0d/week   Breakfast cereal - 0-2d/week (Cinnamon Toast Crunch)   [place]/Protein/Energy bar - 0d/week   Sugar sweetened beverages - 60-80 oz reg soda/wk, 64oz 2% milk/d, 4-7 cups coffee/wk, each with 3-4 tablespoons of sugar with 3-6 oz of flavored creamer in each    Protein - No supplements   Fiber - No supplements     Exercise:    Gym membership - Private gym in [place], but does not go    Walking - None    Running - None    Resistance - None    Aerobic class - None    ______________________    [facility] -  Past Medical History:   Diagnosis Date    ADHD (attention deficit hyperactivity disorder)     Class 3 severe obesity due to excess calories without serious comorbidity with body mass index (BMI) of 40.0 to 44.9 in adult [facility]) 4/30/2019    Depression     Sleep apnea     Tobacco abuse 4/3/2019     Current Outpatient Medications   Medication Sig Dispense Refill    [START ON 5/9/2022] amphetamine-dextroamphetamine (ADDERALL XR) 30 MG extended release capsule Take 1 capsule by mouth daily for 30 days. 30 capsule 0    [START ON 4/9/2022] amphetamine-dextroamphetamine (ADDERALL XR) 30 MG extended release capsule Take 1 capsule by mouth daily for 30 days. 30 capsule 0    amphetamine-dextroamphetamine (ADDERALL XR) 30 MG extended release capsule Take 1 capsule by mouth daily for 30 days. 30 capsule 0    pantoprazole (PROTONIX) 20 MG tablet Take 1 tablet by mouth daily as needed (heartburn) 30 tablet 5    venlafaxine (EFFEXOR XR) 75 MG extended release capsule Take 225 mg daily 30 capsule 5    venlafaxine (EFFEXOR XR) 150 MG extended release capsule Take 1 capsule by mouth daily 30 capsule 5    QUEtiapine (SEROQUEL XR) 50 MG extended release tablet Take 1 tablet by mouth nightly 30 tablet 5    meloxicam (MOBIC) 15 MG tablet take 1 tablet by mouth once daily AS NEEDED FOR PAIN 30 tablet 1    acetaminophen (TYLENOL) 325 MG tablet Take 650 mg by mouth every 6 hours as needed for Pain      Multiple Vitamins-Minerals (THERAPEUTIC MULTIVITAMIN-MINERALS) tablet Take 1 tablet by mouth daily       No current facility-administered medications for this visit. PE -  Gen : /83 (Site: Left Upper Arm, Position: Sitting, Cuff Size: Thigh)   Pulse 90   Temp 97.2 °F (36.2 °C) (Temporal)   Ht 5' 6.5\" (1.689 m)   Wt 277 lb 9.6 oz (125.9 kg)   BMI 44.13 kg/m²    WN, WD, NAD  Lung: Nml resp effort  Psych: Normal mood   Full affect  Neuro:  Moves all ext well  ______________________      HPI & A/P -   Problem 1  - BLADE  HPI   - See above Background for description      Compliant with CPAP 7d/wk, full duration of sleep      Daytime sleepiness for past week: 7/10 (was 8/10) attributes the change to being distracted by so much to do during the day      Excess weight is worsening his underlying airway obstruction  Assessment  - Uncontrolled  Plan   - Cont with nightly CPAP      Proceed with wt reduction per the plan below    Problem 2  - Obesity   HPI   - See above Background for description      Weight Date      304.8 lbs 12/09/20      300.0 lbs 02/15/21      290.8 lbs 03/29/21      284.6 lbs 05/13/21      280.4 lbs 08/03/21       284.4 lbs 11/09/21      281.8 lbs 01/12/22      277.6 lbs 03/09/22  Total weight loss to date: 27.2 lbs  DEN = 2650 gt/day = 18,550 gt/wk  Avg daily energy deficit:   12/09/20-02/15/21 = - 16,800/67d = 250 gt/day   02/15/21-03/29/21 = - 9.2 lbs/42d = 32,200cal/42d= - 770 gt/d deficit   03/29/21-05/13/21 = - 6.2 lbs/45d = 21,700cal/45d= - 482 gt/d deficit   05/13/21-08/03/21 = - 4.2 lbs/82d = 14,700cal/82d= - 179 gt/d deficit   08/03/21-11/09/21 = + 4.0 lbs (14,000 gt)/98d = + 143 gt/d excess   11/09/21-01/12/22 = - 2.6 lbs  (  9,100 gt)/64d = - 142 gt/d deficit   01/12/22-03/09/22 = - 4.2 lbs (14,700 gt)/56d = - 262 gt/d deficit        Weight impact of trouble foods =  Restaurant food 450 gt/wk + Sweets 4200 gt/wk + Chips 2400 gt/wk + Nuts 1800 gt/wk + Soda 840 gt/wk + Milk 6160 gt/wk + Coffee 1500 gt/wk = 17,350 gt/wk = 2,480 gt/day = 248 lbs/yr  His report of his trouble foods were an over-estimation  However, even if they are half of what he states, they still are one-third of his calorie needs. Therefore, eliminating them may produce a sufficient rate of weight loss. Will do this before implementing other interventions. Had success with using shakes as meal substitutes before, so may try this if elimination alone fails.   We did not go with a counting-based regimen per his preference  We discussed wt loss drugs at the 5/13/21 and will not start because of possible side effect  Update:  Does not want to count calories  Cont's Adderlall per Dr. 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[employer] like his steps at work are more than last visit (6500/d)  SSB - DD latte with caramel syrup and skim milk every 3 days  Sweets - 4-5d/wk (amount per day is still less than before the diet,)  Salty snacks - 3d/wk, eating more than 150 gt/d (1 oz/day)  Restaurant food - 4-5x/wk  Milk average 8-10 oz/d (1%)  Exercise - none because of lack of time, but very active at work  · Assessment - Improving;  does not want to count calories; needs to decrease his intake of HR foods  · Plan -   For 1 lb/5 days wt loss, calorie intake must be below 1800 gt/day  Walk 30 min/day [facility]  Establish 8 hours of food-free periods each day: 2 hours before bed time + 6 hours throughout the day, no period less than one hour and they must be the same every day.   Limit sweets to one day per month  Limit restaurant food to once every two weeks  Limit chips/nuts/crackers/pretzels to 150 gt/day (about two handfuls)  Stop all Sugar Sweetened Beverages  Limit milk to 8 oz of 2%/day  Stop all sugar and flavored creamer in coffee    See me in 8-10 weeks    Marilu Kapoor MD  Endocrinology/Obesity  3/9/22

## 2022-03-20 DIAGNOSIS — M54.9 CHRONIC BILATERAL BACK PAIN, UNSPECIFIED BACK LOCATION: ICD-10-CM

## 2022-03-20 DIAGNOSIS — G89.29 CHRONIC BILATERAL BACK PAIN, UNSPECIFIED BACK LOCATION: ICD-10-CM

## 2022-03-20 DIAGNOSIS — M25.562 ACUTE PAIN OF LEFT KNEE: ICD-10-CM

## 2022-03-21 RX ORDER — MELOXICAM 15 MG/1
TABLET ORAL
Qty: 30 TABLET | Refills: 1 | Status: SHIPPED
Start: 2022-03-21 | End: 2022-09-08 | Stop reason: SDUPTHER

## 2022-05-11 ENCOUNTER — TELEPHONE (OUTPATIENT)
Dept: BARIATRICS/WEIGHT MGMT | Age: 32
End: 2022-05-11

## 2022-05-13 ENCOUNTER — TELEPHONE (OUTPATIENT)
Dept: SLEEP CENTER | Age: 32
End: 2022-05-13

## 2022-08-16 ENCOUNTER — OFFICE VISIT (OUTPATIENT)
Dept: SLEEP CENTER | Age: 32
End: 2022-08-16
Payer: COMMERCIAL

## 2022-08-16 VITALS
OXYGEN SATURATION: 98 % | HEART RATE: 93 BPM | HEIGHT: 69 IN | WEIGHT: 263.2 LBS | DIASTOLIC BLOOD PRESSURE: 78 MMHG | BODY MASS INDEX: 38.98 KG/M2 | SYSTOLIC BLOOD PRESSURE: 115 MMHG | RESPIRATION RATE: 16 BRPM

## 2022-08-16 DIAGNOSIS — E66.9 OBESITY, UNSPECIFIED CLASSIFICATION, UNSPECIFIED OBESITY TYPE, UNSPECIFIED WHETHER SERIOUS COMORBIDITY PRESENT: ICD-10-CM

## 2022-08-16 DIAGNOSIS — G47.19 EXCESSIVE DAYTIME SLEEPINESS: ICD-10-CM

## 2022-08-16 DIAGNOSIS — G47.33 OBSTRUCTIVE SLEEP APNEA: Primary | ICD-10-CM

## 2022-08-16 DIAGNOSIS — G25.81 RLS (RESTLESS LEGS SYNDROME): ICD-10-CM

## 2022-08-16 PROCEDURE — 4004F PT TOBACCO SCREEN RCVD TLK: CPT | Performed by: NURSE PRACTITIONER

## 2022-08-16 PROCEDURE — G8417 CALC BMI ABV UP PARAM F/U: HCPCS | Performed by: NURSE PRACTITIONER

## 2022-08-16 PROCEDURE — 99213 OFFICE O/P EST LOW 20 MIN: CPT | Performed by: NURSE PRACTITIONER

## 2022-08-16 PROCEDURE — G8427 DOCREV CUR MEDS BY ELIG CLIN: HCPCS | Performed by: NURSE PRACTITIONER

## 2022-08-16 ASSESSMENT — SLEEP AND FATIGUE QUESTIONNAIRES
ESS TOTAL SCORE: 17
HOW LIKELY ARE YOU TO NOD OFF OR FALL ASLEEP WHILE WATCHING TV: 3
HOW LIKELY ARE YOU TO NOD OFF OR FALL ASLEEP WHILE SITTING AND READING: 3
HOW LIKELY ARE YOU TO NOD OFF OR FALL ASLEEP WHEN YOU ARE A PASSENGER IN A CAR FOR AN HOUR WITHOUT A BREAK: 2
HOW LIKELY ARE YOU TO NOD OFF OR FALL ASLEEP WHILE SITTING INACTIVE IN A PUBLIC PLACE: 2
HOW LIKELY ARE YOU TO NOD OFF OR FALL ASLEEP WHILE SITTING QUIETLY AFTER LUNCH WITHOUT ALCOHOL: 3
HOW LIKELY ARE YOU TO NOD OFF OR FALL ASLEEP WHILE SITTING AND TALKING TO SOMEONE: 1
HOW LIKELY ARE YOU TO NOD OFF OR FALL ASLEEP WHILE LYING DOWN TO REST IN THE AFTERNOON WHEN CIRCUMSTANCES PERMIT: 3
HOW LIKELY ARE YOU TO NOD OFF OR FALL ASLEEP IN A CAR, WHILE STOPPED FOR A FEW MINUTES IN TRAFFIC: 0

## 2022-08-16 NOTE — PROGRESS NOTES
[facility] Sleep Medicine    Patient Name: Bouchra Toney  Age: 33 y.o.   : 1990    Date of Visit: 22        Review of Last Visit Summary:    The patient was last [facility] 2[facility]15/2022 [facility]  [facility] . Interim History:     Bouchra Toney is a 33 y.o. male that  has a past medical history of ADHD (attention deficit hyperactivity disorder), Class 3 severe obesity due to excess calories without serious comorbidity with body mass index (BMI) of 40.0 to 44.9 in [facility]) (2019), Depression, Sleep apnea, and Tobacco abuse (4/3/2019). He presents in follow up to Sleep Clinic to review CPAP adherence and efficacy. 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  -He is in need of new CPAP supplies, using a nasal mask with comfort, but has had same mask for about a year. - Notes benefit with CPAP use, such as more energy the following day and more sound sleep, however, his sleep schedule is not consistent and variable. Sometimes he falls asleep at night with the baby and forgets to put the CPAP on.  -No issues with initiating sleep, or remaining asleep. Still wakes up feeling groggy. Currently taking Adderall Xr 30 mg for ADHD. -Denies RLS symptoms. DME: [facility]      Sleep Study History: Outside sleep study from [facility]. Date 2016. AHI 10.2. Mean SPO2 was 90%. Recommendation CPAP 9.     Past Medical History:  Past Medical History:   Diagnosis Date    ADHD (attention deficit hyperactivity disorder)     Class 3 severe obesity due to excess calories without serious comorbidity with body mass index (BMI) of 40.0 to 44.9 in [facility]) 2019    Depression     Sleep apnea     Tobacco abuse 4/3/2019       Past Surgical History:        Procedure Laterality Date    FOOT SURGERY Left 2009       Allergies:  has No Known Allergies. Social History:    Social History     Tobacco Use    Smoking status: Some Days     Packs/day: 1.00     Years: 10.00     Pack years: 10.00     Types: Cigars, Cigarettes    Smokeless tobacco: Never    Tobacco comments:     Vape   Vaping Use    Vaping Use: Every day    Substances: Nicotine    Devices: [employer]   Substance Use Topics    Alcohol use: Yes     Comment: social    Drug use: No        Family History:       Problem Relation Age of Onset    Heart Disease Mother     Heart Attack Father     Heart Disease Father     No Known Problems Sister        Current Medications:    Current Outpatient Medications:     QUEtiapine (SEROQUEL XR) 50 MG extended release tablet, Take 1 tablet by mouth nightly, Disp: 30 tablet, Rfl: 5    venlafaxine (EFFEXOR XR) 150 MG extended release capsule, Take 1 capsule by mouth daily, Disp: 30 capsule, Rfl: 5    venlafaxine (EFFEXOR XR) 75 MG extended release capsule, Take 225 mg daily, Disp: 30 capsule, Rfl: 5    amphetamine-dextroamphetamine (ADDERALL XR) 30 MG extended release capsule, Take 1 capsule by mouth daily for 30 days. , Disp: 30 capsule, Rfl: 0    fluticasone (FLONASE) 50 MCG/ACT nasal spray, 2 sprays by Each Nostril route daily, Disp: 16 g, Rfl: 0    meloxicam (MOBIC) 15 MG tablet, take 1 tablet by mouth once daily AS NEEDED FOR PAIN, Disp: 30 tablet, Rfl: 1    pantoprazole (PROTONIX) 20 MG tablet, Take 1 tablet by mouth daily as needed (heartburn), Disp: 30 tablet, Rfl: 5    acetaminophen (TYLENOL) 325 MG tablet, Take 650 mg by mouth every 6 hours as needed for Pain, Disp: , Rfl:     Multiple Vitamins-Minerals (THERAPEUTIC MULTIVITAMIN-MINERALS) tablet, Take 1 tablet by mouth daily, Disp: , Rfl:     amphetamine-dextroamphetamine (ADDERALL XR) 30 MG extended release capsule, Take 1 capsule by mouth daily for 30 days. , Disp: 30 capsule, Rfl: 0    amphetamine-dextroamphetamine (ADDERALL XR) 30 MG extended release capsule, Take 1 capsule by mouth daily for 30 days. , Disp: 30 capsule, Rfl: 0    amphetamine-dextroamphetamine (ADDERALL XR) 30 MG extended release capsule, Take 1 capsule by mouth daily for 30 days. , Disp: 30 capsule, Rfl: 0    amphetamine-dextroamphetamine (ADDERALL XR) 30 MG extended release capsule, Take 1 capsule by mouth daily for 30 days. , Disp: 30 capsule, Rfl: 0    Sleep Medicine 8/16/2022 2/15/2022 10/14/2021 4/8/2021 11/17/2020 8/12/2020   Sitting and reading 3 1 3 1 1 -   Watching TV 3 2 3 3 3 -   Sitting, inactive in a public place (e.g. a theatre or a meeting) 2 1 2 1 3 -   As a passenger in a car for an hour without a break 2 1 3 1 1 -   Lying down to rest in the afternoon when circumstances permit 3 3 3 3 3 -   Sitting and talking to someone 1 1 1 0 0 -   Sitting quietly after a lunch without alcohol 3 1 3 1 2 -   In a car, while stopped for a few minutes in traffic 0 0 0 0 0 -   Total score 17 10 18 10 13 -   Neck circumference (Inches) - - - 18 18 17.5       Review of Systems:    Constitutional: no chills, no fever   Eyes: no blurred vision  Cardiovascular: no chest pain,   Respiratory: no cough, no shortness of breath   Gastrointestinal:  no nausea,  no vomiting, no diarrhea. Musculoskeletal: no arthralgias, no back pain   Neurological:  no dizziness,  no headache, no memory changes. Endocrine: No chills    Objective:   PHYSICAL EXAM:    /78 (Site: Left Upper Arm, Position: Sitting, Cuff Size: Large Adult)   Pulse 93   Resp 16   Ht 5' 9\" (1.753 m)   Wt 263 lb 3.2 oz (119.4 kg)   SpO2 98%   BMI 38.87 kg/m²     Physical exam:  Gen: No acute distress. BMI of Body mass index is 38.87 kg/m². Neck: Trachea midline. No obvious mass. Neck circumference     Resp: No accessory muscle use. No crackles. No wheezes. No rhonchi. CV: Regular rate. Regular rhythm. No murmur or rub. Skin: Warm and dry. M/S: No cyanosis. No obvious joint deformity. Neuro: Awake. Alert. Moves all four extremities. Psych: Alert and oriented. No anxiety. CPAP Compliance Download -- accessed via [employer] 8/16/22. Set up 8/20/20          Assessment:      Simran Aguilar was seen today for sleep apnea. Diagnoses and all orders for this visit:    Obstructive sleep apnea  -     DME Order for CPAP as OP    Excessive daytime sleepiness    RLS (restless legs syndrome)    Obesity, unspecified classification, unspecified obesity type, unspecified whether serious comorbidity present     Plan:     Jessica Ward is a 33 y.o. male that  has a past medical history of ADHD (attention deficit hyperactivity disorder), Class 3 severe obesity due to excess calories without serious comorbidity with body mass index (BMI) of 40.0 to 44.9 in [facility]) (4/30/2019), Depression, Sleep apnea, and Tobacco abuse (4/3/2019). He presents in follow up to Sleep Clinic to review CPAP adherence and efficacy. He demonstrates decent adherence with resolution of respiratory events (residual AHI 0.3). He does note benefit with CPAP and is motivated to continue use. His work and personal schedule is hectic and his sleep cycles are variable. Obstructive Sleep Apnea    -Based on sleep study results, review of device remote download, and discussion with patient, will continue auto-CPAP therapy at settings of 8-16 cm of water. - Settings are appropriate, with residual AHI 0.3 and maximum and average pressures within prescribed range. - DME is [facility]. - Patient is using a nasal  mask. Due for new supplies, order placed. No significant leak. -Previously discussed pathophysiology of BLADE and its impact on daily well-being, as well as cardiometabolic and neurocognitive health (particularly in moderate-severe cases).   -Patient understands that CPAP should be worn every night for the duration of the night (in order to not miss therapy during early-morning REM period) for maximum benefit.   -Discussed dedicating 6-7 hours of sleep at night, with CPAP use nightly.  -Counseled on risks of driving while drowsy. Recommended a short, 10-15 min power nap (in a safe location, with car doors locked) as most effective tool if experiencing drowsiness while driving. 2. Excessive Daytime Sleepiness     -Elevated [place] 17/24. 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  -Counseled on risks of driving while drowsy. Recommended a short, 10-15 min power nap (in a safe location, with car doors locked) as most effective tool if experiencing drowsiness while driving.   -Consider taking the Adderall an hour later in the am, if he is needing to work late.  -Can utilize power naps 20-30 min- use CPAP with naps. -Counseled on risks of driving while drowsy. Recommended a short, 10-15 min power nap (in a safe location, with car doors locked) as most effective tool if experiencing drowsiness while driving.   -Dedicate several nights a week to his sleep pattern, instead of using electronics prior to bed. He admits that sometimes he gets \"wrapped up\" in shows instead of going to bed. 3. RLS- resolved    - Rare now with CPAP use. -Conservative measures discussed such as walking around prior to bed, massage, the use of weighted or heated blankets.  -Will continue to monitor. 4. Obesity. Body mass index is 38.87 kg/m². -Discussed impact of weight gain on BLADE severity. Patient understands that BLADE severity may improve with weight loss but no guarantee of cure can be made. Follow up: Return in about 9 months (around 5/16/2023) for Follow up for sleep apnea.     Emani Zuniga, APRN-CNP  [facility] -[phone] option 2  - [phone]

## 2022-12-06 PROBLEM — F41.9 ANXIETY: Status: ACTIVE | Noted: 2022-12-06

## 2022-12-06 PROBLEM — K21.9 GASTROESOPHAGEAL REFLUX DISEASE: Status: ACTIVE | Noted: 2022-12-06

## 2023-03-28 ENCOUNTER — OFFICE VISIT (OUTPATIENT)
Dept: SLEEP CENTER | Age: 33
End: 2023-03-28
Payer: COMMERCIAL

## 2023-03-28 VITALS
WEIGHT: 272.7 LBS | RESPIRATION RATE: 16 BRPM | OXYGEN SATURATION: 96 % | HEIGHT: 69 IN | BODY MASS INDEX: 40.39 KG/M2 | HEART RATE: 105 BPM | SYSTOLIC BLOOD PRESSURE: 127 MMHG | DIASTOLIC BLOOD PRESSURE: 88 MMHG

## 2023-03-28 DIAGNOSIS — G47.33 OBSTRUCTIVE SLEEP APNEA: Primary | ICD-10-CM

## 2023-03-28 DIAGNOSIS — E66.9 OBESITY, UNSPECIFIED CLASSIFICATION, UNSPECIFIED OBESITY TYPE, UNSPECIFIED WHETHER SERIOUS COMORBIDITY PRESENT: ICD-10-CM

## 2023-03-28 DIAGNOSIS — G47.19 EXCESSIVE DAYTIME SLEEPINESS: ICD-10-CM

## 2023-03-28 PROCEDURE — 4004F PT TOBACCO SCREEN RCVD TLK: CPT | Performed by: NURSE PRACTITIONER

## 2023-03-28 PROCEDURE — 99214 OFFICE O/P EST MOD 30 MIN: CPT | Performed by: NURSE PRACTITIONER

## 2023-03-28 PROCEDURE — G8427 DOCREV CUR MEDS BY ELIG CLIN: HCPCS | Performed by: NURSE PRACTITIONER

## 2023-03-28 PROCEDURE — G8482 FLU IMMUNIZE ORDER/ADMIN: HCPCS | Performed by: NURSE PRACTITIONER

## 2023-03-28 PROCEDURE — G8417 CALC BMI ABV UP PARAM F/U: HCPCS | Performed by: NURSE PRACTITIONER

## 2023-03-28 ASSESSMENT — SLEEP AND FATIGUE QUESTIONNAIRES
HOW LIKELY ARE YOU TO NOD OFF OR FALL ASLEEP WHILE LYING DOWN TO REST IN THE AFTERNOON WHEN CIRCUMSTANCES PERMIT: 3
HOW LIKELY ARE YOU TO NOD OFF OR FALL ASLEEP WHILE SITTING AND READING: 2
HOW LIKELY ARE YOU TO NOD OFF OR FALL ASLEEP IN A CAR, WHILE STOPPED FOR A FEW MINUTES IN TRAFFIC: 0
HOW LIKELY ARE YOU TO NOD OFF OR FALL ASLEEP WHILE WATCHING TV: 3
ESS TOTAL SCORE: 12
HOW LIKELY ARE YOU TO NOD OFF OR FALL ASLEEP WHILE SITTING AND TALKING TO SOMEONE: 0
HOW LIKELY ARE YOU TO NOD OFF OR FALL ASLEEP WHILE SITTING QUIETLY AFTER LUNCH WITHOUT ALCOHOL: 1
HOW LIKELY ARE YOU TO NOD OFF OR FALL ASLEEP WHEN YOU ARE A PASSENGER IN A CAR FOR AN HOUR WITHOUT A BREAK: 2
HOW LIKELY ARE YOU TO NOD OFF OR FALL ASLEEP WHILE SITTING INACTIVE IN A PUBLIC PLACE: 1

## 2023-03-28 NOTE — PROGRESS NOTES
[facility] Sleep Medicine    Patient Name: Lita Pierce  Age: 27 y.o.   : 1990    Date of Visit: 3/28/23        Review of Last Visit Summary:    The patient was last seen on 2022 for  Obstructive Sleep Apnea. Interim History:     Liat Pierce is a 27 y.o. male that  has a past medical history of ADHD (attention deficit hyperactivity disorder), Class 3 severe obesity due to excess calories without serious comorbidity with body mass index (BMI) of 40.0 to 44.9 in adult [facility]) (2019), Depression, Sleep apnea, and Tobacco abuse (4/3/2019). He presents in follow up to Sleep Clinic to review CPAP adherence and efficacy. Interval Events:    -Continues to use a Dream Wear nasal mask with CPAP, but is in need of a new mask- his is currently 3years old and the straps are breaking. Order placed last visit, he did not receive mask.  -Attempt to use CPAP on a nightly basis, currently his 3year-old child is sleeping in bed with him and waking him up a lot through the night.  -He continues to wake up feeling fatigued, some nights he will just fall asleep on the couch without the CPAP. -He also continues to work a very busy schedule with long hours both during the week and during the weekend.  -Continues to note benefit with CPAP use, and more energy upon awakening.  -Continues to take Adderall extended release 30 mg for ADHD. There was a point in time when he was out of the medication due to a national shortage and he felt his energy was excessively exacerbated during this time.  -No new or further sleep complaints. [facility]     Sleep Study History: Outside sleep study from [facility]. Date 2016. AHI 10.2. Mean SPO2 was 90%. Recommendation CPAP 9.       Past Medical History:  Past Medical History:   Diagnosis Date    ADHD (attention deficit hyperactivity disorder)     Class 3 severe obesity due to excess calories without serious comorbidity with body

## 2023-09-12 ENCOUNTER — OFFICE VISIT (OUTPATIENT)
Dept: SLEEP CENTER | Age: 33
End: 2023-09-12
Payer: COMMERCIAL

## 2023-09-12 VITALS
BODY MASS INDEX: 40.23 KG/M2 | HEIGHT: 69 IN | RESPIRATION RATE: 16 BRPM | DIASTOLIC BLOOD PRESSURE: 69 MMHG | WEIGHT: 271.61 LBS | SYSTOLIC BLOOD PRESSURE: 107 MMHG | OXYGEN SATURATION: 97 % | HEART RATE: 83 BPM

## 2023-09-12 DIAGNOSIS — G47.19 EXCESSIVE DAYTIME SLEEPINESS: ICD-10-CM

## 2023-09-12 DIAGNOSIS — E66.9 OBESITY, UNSPECIFIED CLASSIFICATION, UNSPECIFIED OBESITY TYPE, UNSPECIFIED WHETHER SERIOUS COMORBIDITY PRESENT: ICD-10-CM

## 2023-09-12 DIAGNOSIS — G47.33 OBSTRUCTIVE SLEEP APNEA: Primary | ICD-10-CM

## 2023-09-12 PROCEDURE — G8417 CALC BMI ABV UP PARAM F/U: HCPCS | Performed by: NURSE PRACTITIONER

## 2023-09-12 PROCEDURE — G8427 DOCREV CUR MEDS BY ELIG CLIN: HCPCS | Performed by: NURSE PRACTITIONER

## 2023-09-12 PROCEDURE — 4004F PT TOBACCO SCREEN RCVD TLK: CPT | Performed by: NURSE PRACTITIONER

## 2023-09-12 PROCEDURE — 99214 OFFICE O/P EST MOD 30 MIN: CPT | Performed by: NURSE PRACTITIONER

## 2023-09-12 ASSESSMENT — SLEEP AND FATIGUE QUESTIONNAIRES
ESS TOTAL SCORE: 14
HOW LIKELY ARE YOU TO NOD OFF OR FALL ASLEEP WHILE SITTING AND TALKING TO SOMEONE: 0
HOW LIKELY ARE YOU TO NOD OFF OR FALL ASLEEP WHILE SITTING QUIETLY AFTER LUNCH WITHOUT ALCOHOL: 2
HOW LIKELY ARE YOU TO NOD OFF OR FALL ASLEEP WHILE LYING DOWN TO REST IN THE AFTERNOON WHEN CIRCUMSTANCES PERMIT: 3
HOW LIKELY ARE YOU TO NOD OFF OR FALL ASLEEP WHILE WATCHING TV: 3
HOW LIKELY ARE YOU TO NOD OFF OR FALL ASLEEP WHEN YOU ARE A PASSENGER IN A CAR FOR AN HOUR WITHOUT A BREAK: 1
HOW LIKELY ARE YOU TO NOD OFF OR FALL ASLEEP WHILE SITTING INACTIVE IN A PUBLIC PLACE: 2
HOW LIKELY ARE YOU TO NOD OFF OR FALL ASLEEP WHILE SITTING AND READING: 3
HOW LIKELY ARE YOU TO NOD OFF OR FALL ASLEEP IN A CAR, WHILE STOPPED FOR A FEW MINUTES IN TRAFFIC: 0